# Patient Record
Sex: MALE | Race: WHITE | Employment: FULL TIME | URBAN - METROPOLITAN AREA
[De-identification: names, ages, dates, MRNs, and addresses within clinical notes are randomized per-mention and may not be internally consistent; named-entity substitution may affect disease eponyms.]

---

## 2021-01-04 ENCOUNTER — HOSPITAL ENCOUNTER (OUTPATIENT)
Dept: LAB | Age: 61
Discharge: HOME | End: 2021-01-04

## 2021-01-04 ENCOUNTER — TRANSCRIBE ORDER (OUTPATIENT)
Dept: REGISTRATION | Age: 61
End: 2021-01-04

## 2021-01-04 ENCOUNTER — HOSPITAL ENCOUNTER (OUTPATIENT)
Dept: NON INVASIVE DIAGNOSTICS | Age: 61
Discharge: HOME | End: 2021-01-04
Payer: COMMERCIAL

## 2021-01-04 DIAGNOSIS — M16.11 PRIMARY OSTEOARTHRITIS OF RIGHT HIP: ICD-10-CM

## 2021-01-04 DIAGNOSIS — Z01.812 BLOOD TESTS PRIOR TO TREATMENT OR PROCEDURE: ICD-10-CM

## 2021-01-04 DIAGNOSIS — M16.11 PRIMARY OSTEOARTHRITIS OF RIGHT HIP: Primary | ICD-10-CM

## 2021-01-04 DIAGNOSIS — Z01.812 BLOOD TESTS PRIOR TO TREATMENT OR PROCEDURE: Primary | ICD-10-CM

## 2021-01-04 LAB — XX-LABCORP SPECIMEN COL,LCBCF: NORMAL

## 2021-01-04 PROCEDURE — 93005 ELECTROCARDIOGRAM TRACING: CPT

## 2021-01-04 PROCEDURE — 99001 SPECIMEN HANDLING PT-LAB: CPT

## 2021-01-05 LAB
ATRIAL RATE: 56 BPM
CALCULATED P AXIS, ECG09: 68 DEGREES
CALCULATED R AXIS, ECG10: 78 DEGREES
CALCULATED T AXIS, ECG11: 72 DEGREES
DIAGNOSIS, 93000: NORMAL
P-R INTERVAL, ECG05: 182 MS
Q-T INTERVAL, ECG07: 438 MS
QRS DURATION, ECG06: 88 MS
QTC CALCULATION (BEZET), ECG08: 422 MS
VENTRICULAR RATE, ECG03: 56 BPM

## 2021-09-16 NOTE — NURSE NAVIGATOR
Greta Moreno watched the [employer]onna[employer] and received a preoperative education booklet in anticipation of joint replacement surgery.      Orthopaedic Nurse Navigator

## 2021-10-12 ENCOUNTER — TRANSCRIBE ORDER (OUTPATIENT)
Dept: REGISTRATION | Age: 61
End: 2021-10-12

## 2021-10-12 ENCOUNTER — HOSPITAL ENCOUNTER (OUTPATIENT)
Dept: PREADMISSION TESTING | Age: 61
Discharge: HOME | End: 2021-10-12
Payer: COMMERCIAL

## 2021-10-12 ENCOUNTER — HOSPITAL ENCOUNTER (OUTPATIENT)
Dept: LAB | Age: 61
Discharge: HOME | End: 2021-10-12

## 2021-10-12 DIAGNOSIS — M16.11 PRIMARY OSTEOARTHRITIS OF RIGHT HIP: ICD-10-CM

## 2021-10-12 DIAGNOSIS — M16.11 PRIMARY OSTEOARTHRITIS OF RIGHT HIP: Primary | ICD-10-CM

## 2021-10-12 LAB
ATRIAL RATE: 52 BPM
CALCULATED P AXIS, ECG09: 49 DEGREES
CALCULATED R AXIS, ECG10: 77 DEGREES
CALCULATED T AXIS, ECG11: 55 DEGREES
DIAGNOSIS, 93000: NORMAL
P-R INTERVAL, ECG05: 170 MS
Q-T INTERVAL, ECG07: 414 MS
QRS DURATION, ECG06: 82 MS
QTC CALCULATION (BEZET), ECG08: 385 MS
SENTARA SPECIMEN COL,SENBCF: NORMAL
VENTRICULAR RATE, ECG03: 52 BPM

## 2021-10-12 PROCEDURE — 93005 ELECTROCARDIOGRAM TRACING: CPT

## 2021-10-12 PROCEDURE — 99001 SPECIMEN HANDLING PT-LAB: CPT

## 2021-10-22 ENCOUNTER — HOSPITAL ENCOUNTER (OUTPATIENT)
Dept: PREADMISSION TESTING | Age: 61
Discharge: HOME | End: 2021-10-22

## 2021-10-22 VITALS — WEIGHT: 175 LBS | BODY MASS INDEX: 25.92 KG/M2 | HEIGHT: 69 IN

## 2021-10-22 RX ORDER — ATORVASTATIN CALCIUM 80 MG/1
80 TABLET, FILM COATED ORAL
COMMUNITY

## 2021-10-22 RX ORDER — GLUCOSAMINE SULFATE 1500 MG
1000 POWDER IN PACKET (EA) ORAL DAILY
COMMUNITY

## 2021-10-22 RX ORDER — CEFAZOLIN SODIUM/WATER 2 G/20 ML
2 SYRINGE (ML) INTRAVENOUS ONCE
Status: CANCELLED | OUTPATIENT
Start: 2021-10-22 | End: 2021-10-22

## 2021-10-22 RX ORDER — LANOLIN ALCOHOL/MO/W.PET/CERES
1000 CREAM (GRAM) TOPICAL DAILY
COMMUNITY

## 2021-10-22 RX ORDER — DIPHENHYDRAMINE HCL 25 MG
25 CAPSULE ORAL
COMMUNITY

## 2021-10-22 RX ORDER — SODIUM CHLORIDE, SODIUM LACTATE, POTASSIUM CHLORIDE, CALCIUM CHLORIDE 600; 310; 30; 20 MG/100ML; MG/100ML; MG/100ML; MG/100ML
125 INJECTION, SOLUTION INTRAVENOUS CONTINUOUS
Status: CANCELLED | OUTPATIENT
Start: 2021-10-22

## 2021-10-22 RX ORDER — CELECOXIB 200 MG/1
200 CAPSULE ORAL 2 TIMES DAILY
COMMUNITY
End: 2021-10-28

## 2021-10-22 NOTE — PERIOP NOTES
Patient educated on NPO, CHG, and current COVID 19 protocols. Patient states he is fully vaccinated. Patient states he will bring vaccination record to [facility] on 10/25/2021 between [id number] when COVID screened with self quarantine from that date till [address]. Medications reviewed. Patient educated on current visitation policies. Patient advised to leave valuables at home or with a loved one. All questions answered by RN. Preoperative Nutrition Screen (WALESKA)   Patient's Age: 59 y.o. Patient's BMI: Estimated body mass index is 25.84 kg/m² as calculated from the following:    Height as of this encounter: 5' 9\" (1.753 m). Weight as of this encounter: 79.4 kg (175 lb). 1. Does the patient have a documented serum albumin less than 3.0 within the last 90 days? No = 0          2. Is patient's BMI less than 18.5 (or less than 20 if age over 72)? No = 0        3. Has the patient had an unplanned weight loss of 10% of body weight or more in the last 6 months? No = 0   4. Has the patient been eating less than 50% of their normal diet in the preceding week?  No = 0   WALESKA Score (number of Yes responses), 0-4 0       Electronically signed by Saadia Florez on 10/22/21 at 11:32 AM

## 2021-10-25 ENCOUNTER — HOSPITAL ENCOUNTER (OUTPATIENT)
Dept: PREADMISSION TESTING | Age: 61
Discharge: HOME | End: 2021-10-25
Payer: COMMERCIAL

## 2021-10-25 PROCEDURE — U0003 INFECTIOUS AGENT DETECTION BY NUCLEIC ACID (DNA OR RNA); SEVERE ACUTE RESPIRATORY SYNDROME CORONAVIRUS 2 (SARS-COV-2) (CORONAVIRUS DISEASE [COVID-19]), AMPLIFIED PROBE TECHNIQUE, MAKING USE OF HIGH THROUGHPUT TECHNOLOGIES AS DESCRIBED BY CMS-2020-01-R: HCPCS

## 2021-10-27 ENCOUNTER — ANESTHESIA EVENT (OUTPATIENT)
Dept: SURGERY | Age: 61
End: 2021-10-27
Payer: COMMERCIAL

## 2021-10-27 ENCOUNTER — HOME HEALTH ADMISSION (OUTPATIENT)
Dept: HOME HEALTH SERVICES | Facility: HOME HEALTH | Age: 61
End: 2021-10-27
Payer: COMMERCIAL

## 2021-10-27 PROBLEM — M16.11 OSTEOARTHRITIS OF RIGHT HIP: Chronic | Status: ACTIVE | Noted: 2021-10-27

## 2021-10-27 LAB — SARS-COV-2, NAA: NOT DETECTED

## 2021-10-27 RX ORDER — ACETAMINOPHEN 500 MG
1000 TABLET ORAL
Status: CANCELLED | OUTPATIENT
Start: 2021-10-27 | End: 2021-10-27

## 2021-10-27 NOTE — H&P
[address] [facility] [address]  History and Physical Exam    Patient: Leah Nichols MRN: [id number]  SSN: [id number]    YOB: 1960  Age: 59 y.o. Sex: male      Subjective:      Chief Complaint: right hip pain    History of Present Illness:  Patient complains of right hip pain and difficulty ambulating, which has progressed over the past several months. X-rays showed osteoarthritis of the joint. The patient's pain has persisted and progressed despite conservative treatments and therapies. The patient has been previously treated with medications and/or injections. The patient has at this time opted for surgical intervention. Past Medical History:   Diagnosis Date    Arthritis     [facility]     factor VII    Osteoarthritis of right hip 10/27/2021     Past Surgical History:   Procedure Laterality Date    HX ORTHOPAEDIC Left 2010    hip replacement     Social History     Occupational History    Not on file   Tobacco Use    Smoking status: Never Smoker    Smokeless tobacco: Never Used   Vaping Use    Vaping Use: Never used   Substance and Sexual Activity    Alcohol use: Yes     Alcohol/week: 15.0 standard drinks     Types: 15 Glasses of wine per week    Drug use: Never    Sexual activity: Not on file     Prior to Admission medications    Medication Sig Start Date End Date Taking? Authorizing Provider   celecoxib (CeleBREX) 200 mg capsule Take 200 mg by mouth two (2) times a day. Provider, Historical   atorvastatin (LIPITOR) 80 mg tablet Take 80 mg by mouth nightly. Provider, Historical   diphenhydrAMINE (BenadryL) 25 mg capsule Take 25 mg by mouth nightly. Provider, Historical   cyanocobalamin (Vitamin B-12) 1,000 mcg tablet Take 1,000 mcg by mouth daily. Provider, Historical   cholecalciferol (Vitamin D3) 25 mcg (1,000 unit) cap Take 1,000 Units by mouth daily.     Provider, Historical       Allergies: No Known Allergies     Review of Systems:  Pertinent items are noted in the History of Present Illness. Objective:       Physical Exam:  HEENT: Normocephalic, atraumatic  Lungs:  Clear to auscultation  Heart:   Regular rate and rhythm  Abdomen: Soft  Extremities:  Pain with range of motion of the right hip. Passive flexion  degrees,                       passive internal rotation 0-10 degrees, with pain throughout ROM,                        passive external rotation 10-20 degrees with pain at the arc of motion. Antalgic gait noted. Assessment:      Arthritis of the right hip. Plan:       Proceed with scheduled RIGHT TOTAL HIP ARTHROPLASTY. The various methods of treatment have been discussed with the patient and family. After consideration of risks, benefits, and other options for treatment, the patient has consented to surgical interventions. Questions were answered and preoperative teaching was done by Dr Dereje Quintero.      Signed By: PALOMO Wood     October 27, 2021

## 2021-10-28 ENCOUNTER — APPOINTMENT (OUTPATIENT)
Dept: GENERAL RADIOLOGY | Age: 61
End: 2021-10-28
Attending: PHYSICIAN ASSISTANT
Payer: COMMERCIAL

## 2021-10-28 ENCOUNTER — ANESTHESIA (OUTPATIENT)
Dept: SURGERY | Age: 61
End: 2021-10-28
Payer: COMMERCIAL

## 2021-10-28 ENCOUNTER — APPOINTMENT (OUTPATIENT)
Dept: GENERAL RADIOLOGY | Age: 61
End: 2021-10-28
Attending: ORTHOPAEDIC SURGERY
Payer: COMMERCIAL

## 2021-10-28 ENCOUNTER — HOSPITAL ENCOUNTER (OUTPATIENT)
Age: 61
Discharge: HOME HEALTH | End: 2021-10-28
Attending: ORTHOPAEDIC SURGERY | Admitting: ORTHOPAEDIC SURGERY
Payer: COMMERCIAL

## 2021-10-28 VITALS
OXYGEN SATURATION: 97 % | BODY MASS INDEX: 26.17 KG/M2 | RESPIRATION RATE: 16 BRPM | WEIGHT: 176.7 LBS | HEIGHT: 69 IN | DIASTOLIC BLOOD PRESSURE: 73 MMHG | SYSTOLIC BLOOD PRESSURE: 117 MMHG | HEART RATE: 57 BPM | TEMPERATURE: 97.7 F

## 2021-10-28 DIAGNOSIS — M16.11 PRIMARY OSTEOARTHRITIS OF RIGHT HIP: Primary | ICD-10-CM

## 2021-10-28 LAB
ABO + RH BLD: NORMAL
BLOOD GROUP ANTIBODIES SERPL: NORMAL
SPECIMEN EXP DATE BLD: NORMAL

## 2021-10-28 PROCEDURE — 74011250636 HC RX REV CODE- 250/636: Performed by: ORTHOPAEDIC SURGERY

## 2021-10-28 PROCEDURE — 74011250636 HC RX REV CODE- 250/636: Performed by: ANESTHESIOLOGY

## 2021-10-28 PROCEDURE — 76010000153 HC OR TIME 1.5 TO 2 HR: Performed by: ORTHOPAEDIC SURGERY

## 2021-10-28 PROCEDURE — 86901 BLOOD TYPING SEROLOGIC RH(D): CPT

## 2021-10-28 PROCEDURE — 76060000034 HC ANESTHESIA 1.5 TO 2 HR: Performed by: ORTHOPAEDIC SURGERY

## 2021-10-28 PROCEDURE — 77030027138 HC INCENT SPIROMETER -A: Performed by: ORTHOPAEDIC SURGERY

## 2021-10-28 PROCEDURE — 77030011264 HC ELECTRD BLD EXT COVD -A: Performed by: ORTHOPAEDIC SURGERY

## 2021-10-28 PROCEDURE — 77030003666 HC NDL SPINAL BD -A: Performed by: ORTHOPAEDIC SURGERY

## 2021-10-28 PROCEDURE — 97165 OT EVAL LOW COMPLEX 30 MIN: CPT

## 2021-10-28 PROCEDURE — C1776 JOINT DEVICE (IMPLANTABLE): HCPCS | Performed by: ORTHOPAEDIC SURGERY

## 2021-10-28 PROCEDURE — 77010033678 HC OXYGEN DAILY

## 2021-10-28 PROCEDURE — 97161 PT EVAL LOW COMPLEX 20 MIN: CPT

## 2021-10-28 PROCEDURE — 77030031139 HC SUT VCRL2 J&J -A: Performed by: ORTHOPAEDIC SURGERY

## 2021-10-28 PROCEDURE — 74011250636 HC RX REV CODE- 250/636: Performed by: NURSE ANESTHETIST, CERTIFIED REGISTERED

## 2021-10-28 PROCEDURE — 77030012893: Performed by: ORTHOPAEDIC SURGERY

## 2021-10-28 PROCEDURE — 77030020782 HC GWN BAIR PAWS FLX 3M -B: Performed by: ORTHOPAEDIC SURGERY

## 2021-10-28 PROCEDURE — 74011250637 HC RX REV CODE- 250/637: Performed by: PHYSICIAN ASSISTANT

## 2021-10-28 PROCEDURE — 77030037713 HC CLOSR DEV INCIS ZIP STRY -B: Performed by: ORTHOPAEDIC SURGERY

## 2021-10-28 PROCEDURE — 74011000250 HC RX REV CODE- 250: Performed by: ORTHOPAEDIC SURGERY

## 2021-10-28 PROCEDURE — 76210000006 HC OR PH I REC 0.5 TO 1 HR: Performed by: ORTHOPAEDIC SURGERY

## 2021-10-28 PROCEDURE — 97535 SELF CARE MNGMENT TRAINING: CPT

## 2021-10-28 PROCEDURE — 2709999900 HC NON-CHARGEABLE SUPPLY: Performed by: ORTHOPAEDIC SURGERY

## 2021-10-28 PROCEDURE — 74011000250 HC RX REV CODE- 250: Performed by: NURSE ANESTHETIST, CERTIFIED REGISTERED

## 2021-10-28 PROCEDURE — 73501 X-RAY EXAM HIP UNI 1 VIEW: CPT

## 2021-10-28 PROCEDURE — 74011250637 HC RX REV CODE- 250/637: Performed by: ORTHOPAEDIC SURGERY

## 2021-10-28 PROCEDURE — 77030013708 HC HNDPC SUC IRR PULS STRY –B: Performed by: ORTHOPAEDIC SURGERY

## 2021-10-28 PROCEDURE — 77030038010: Performed by: ORTHOPAEDIC SURGERY

## 2021-10-28 PROCEDURE — 77030040361 HC SLV COMPR DVT MDII -B: Performed by: ORTHOPAEDIC SURGERY

## 2021-10-28 PROCEDURE — 77030041461 HC RETRCTR GRIPPR KUSA -C: Performed by: ORTHOPAEDIC SURGERY

## 2021-10-28 PROCEDURE — 77030041075 HC DRSG AG OPTIFRM MDII -B: Performed by: ORTHOPAEDIC SURGERY

## 2021-10-28 PROCEDURE — 76210000023 HC REC RM PH II 2 TO 2.5 HR

## 2021-10-28 PROCEDURE — 74011250636 HC RX REV CODE- 250/636: Performed by: PHYSICIAN ASSISTANT

## 2021-10-28 PROCEDURE — 97116 GAIT TRAINING THERAPY: CPT

## 2021-10-28 PROCEDURE — 36415 COLL VENOUS BLD VENIPUNCTURE: CPT

## 2021-10-28 DEVICE — IMPLANTABLE DEVICE
Type: IMPLANTABLE DEVICE | Site: HIP | Status: FUNCTIONAL
Brand: SIGNATURE FEMORAL HEAD

## 2021-10-28 DEVICE — IMPLANTABLE DEVICE
Type: IMPLANTABLE DEVICE | Site: HIP | Status: FUNCTIONAL
Brand: LOGICAL

## 2021-10-28 DEVICE — IMPLANTABLE DEVICE
Type: IMPLANTABLE DEVICE | Site: HIP | Status: FUNCTIONAL
Brand: SPARTAN HIP STEM

## 2021-10-28 DEVICE — IMPLANTABLE DEVICE
Type: IMPLANTABLE DEVICE | Site: HIP | Status: FUNCTIONAL
Brand: LOGICAL G-SERIES

## 2021-10-28 RX ORDER — PANTOPRAZOLE SODIUM 40 MG/1
40 TABLET, DELAYED RELEASE ORAL DAILY
Status: DISCONTINUED | OUTPATIENT
Start: 2021-10-28 | End: 2021-10-28 | Stop reason: HOSPADM

## 2021-10-28 RX ORDER — SODIUM CHLORIDE, SODIUM LACTATE, POTASSIUM CHLORIDE, CALCIUM CHLORIDE 600; 310; 30; 20 MG/100ML; MG/100ML; MG/100ML; MG/100ML
125 INJECTION, SOLUTION INTRAVENOUS CONTINUOUS
Status: DISCONTINUED | OUTPATIENT
Start: 2021-10-28 | End: 2021-10-28 | Stop reason: HOSPADM

## 2021-10-28 RX ORDER — OXYCODONE HYDROCHLORIDE 5 MG/1
5 TABLET ORAL
Status: DISCONTINUED | OUTPATIENT
Start: 2021-10-28 | End: 2021-10-28 | Stop reason: HOSPADM

## 2021-10-28 RX ORDER — SODIUM CHLORIDE 9 MG/ML
125 INJECTION, SOLUTION INTRAVENOUS CONTINUOUS
Status: DISCONTINUED | OUTPATIENT
Start: 2021-10-28 | End: 2021-10-28 | Stop reason: HOSPADM

## 2021-10-28 RX ORDER — HYDROMORPHONE HYDROCHLORIDE 1 MG/ML
0.2 INJECTION, SOLUTION INTRAMUSCULAR; INTRAVENOUS; SUBCUTANEOUS
Status: DISCONTINUED | OUTPATIENT
Start: 2021-10-28 | End: 2021-10-28 | Stop reason: HOSPADM

## 2021-10-28 RX ORDER — DEXAMETHASONE SODIUM PHOSPHATE 4 MG/ML
8 INJECTION, SOLUTION INTRA-ARTICULAR; INTRALESIONAL; INTRAMUSCULAR; INTRAVENOUS; SOFT TISSUE ONCE
Status: COMPLETED | OUTPATIENT
Start: 2021-10-28 | End: 2021-10-28

## 2021-10-28 RX ORDER — DOCUSATE SODIUM 100 MG/1
100 CAPSULE, LIQUID FILLED ORAL DAILY
Status: DISCONTINUED | OUTPATIENT
Start: 2021-10-29 | End: 2021-10-28 | Stop reason: HOSPADM

## 2021-10-28 RX ORDER — SODIUM CHLORIDE 0.9 % (FLUSH) 0.9 %
5-40 SYRINGE (ML) INJECTION AS NEEDED
Status: DISCONTINUED | OUTPATIENT
Start: 2021-10-28 | End: 2021-10-28 | Stop reason: HOSPADM

## 2021-10-28 RX ORDER — PROPOFOL 10 MG/ML
INJECTION, EMULSION INTRAVENOUS AS NEEDED
Status: DISCONTINUED | OUTPATIENT
Start: 2021-10-28 | End: 2021-10-28 | Stop reason: HOSPADM

## 2021-10-28 RX ORDER — ASPIRIN 81 MG/1
81 TABLET ORAL 2 TIMES DAILY
Status: DISCONTINUED | OUTPATIENT
Start: 2021-10-28 | End: 2021-10-28 | Stop reason: HOSPADM

## 2021-10-28 RX ORDER — TRANEXAMIC ACID 10 MG/ML
1000 INJECTION, SOLUTION INTRAVENOUS SEE ADMIN INSTRUCTIONS
Status: DISCONTINUED | OUTPATIENT
Start: 2021-10-28 | End: 2021-10-28 | Stop reason: HOSPADM

## 2021-10-28 RX ORDER — OXYCODONE HYDROCHLORIDE 5 MG/1
5 TABLET ORAL AS NEEDED
Status: DISCONTINUED | OUTPATIENT
Start: 2021-10-28 | End: 2021-10-28 | Stop reason: HOSPADM

## 2021-10-28 RX ORDER — KETAMINE HYDROCHLORIDE 10 MG/ML
INJECTION, SOLUTION INTRAMUSCULAR; INTRAVENOUS AS NEEDED
Status: DISCONTINUED | OUTPATIENT
Start: 2021-10-28 | End: 2021-10-28 | Stop reason: HOSPADM

## 2021-10-28 RX ORDER — ACETAMINOPHEN 500 MG
1000 TABLET ORAL ONCE
Status: COMPLETED | OUTPATIENT
Start: 2021-10-28 | End: 2021-10-28

## 2021-10-28 RX ORDER — NALOXONE HYDROCHLORIDE 0.4 MG/ML
0.4 INJECTION, SOLUTION INTRAMUSCULAR; INTRAVENOUS; SUBCUTANEOUS AS NEEDED
Status: DISCONTINUED | OUTPATIENT
Start: 2021-10-28 | End: 2021-10-28 | Stop reason: HOSPADM

## 2021-10-28 RX ORDER — MIDAZOLAM HYDROCHLORIDE 1 MG/ML
INJECTION, SOLUTION INTRAMUSCULAR; INTRAVENOUS AS NEEDED
Status: DISCONTINUED | OUTPATIENT
Start: 2021-10-28 | End: 2021-10-28 | Stop reason: HOSPADM

## 2021-10-28 RX ORDER — EPHEDRINE SULFATE/0.9% NACL/PF 50 MG/5 ML
SYRINGE (ML) INTRAVENOUS AS NEEDED
Status: DISCONTINUED | OUTPATIENT
Start: 2021-10-28 | End: 2021-10-28 | Stop reason: HOSPADM

## 2021-10-28 RX ORDER — ACETAMINOPHEN 325 MG/1
650 TABLET ORAL EVERY 6 HOURS
Status: DISCONTINUED | OUTPATIENT
Start: 2021-10-28 | End: 2021-10-28 | Stop reason: HOSPADM

## 2021-10-28 RX ORDER — DIPHENHYDRAMINE HYDROCHLORIDE 50 MG/ML
12.5 INJECTION, SOLUTION INTRAMUSCULAR; INTRAVENOUS
Status: DISCONTINUED | OUTPATIENT
Start: 2021-10-28 | End: 2021-10-28 | Stop reason: HOSPADM

## 2021-10-28 RX ORDER — METOCLOPRAMIDE HYDROCHLORIDE 5 MG/ML
10 INJECTION INTRAMUSCULAR; INTRAVENOUS
Status: DISCONTINUED | OUTPATIENT
Start: 2021-10-28 | End: 2021-10-28 | Stop reason: HOSPADM

## 2021-10-28 RX ORDER — SODIUM CHLORIDE 9 MG/ML
300 INJECTION, SOLUTION INTRAVENOUS CONTINUOUS
Status: DISCONTINUED | OUTPATIENT
Start: 2021-10-28 | End: 2021-10-28 | Stop reason: HOSPADM

## 2021-10-28 RX ORDER — LABETALOL HCL 20 MG/4 ML
SYRINGE (ML) INTRAVENOUS AS NEEDED
Status: DISCONTINUED | OUTPATIENT
Start: 2021-10-28 | End: 2021-10-28 | Stop reason: HOSPADM

## 2021-10-28 RX ORDER — CEFADROXIL 500 MG/1
500 CAPSULE ORAL 2 TIMES DAILY
Qty: 10 CAPSULE | Refills: 0 | Status: SHIPPED | OUTPATIENT
Start: 2021-10-28 | End: 2021-11-02

## 2021-10-28 RX ORDER — ONDANSETRON 2 MG/ML
INJECTION INTRAMUSCULAR; INTRAVENOUS AS NEEDED
Status: DISCONTINUED | OUTPATIENT
Start: 2021-10-28 | End: 2021-10-28 | Stop reason: HOSPADM

## 2021-10-28 RX ORDER — TRANEXAMIC ACID 100 MG/ML
INJECTION, SOLUTION INTRAVENOUS AS NEEDED
Status: DISCONTINUED | OUTPATIENT
Start: 2021-10-28 | End: 2021-10-28 | Stop reason: HOSPADM

## 2021-10-28 RX ORDER — SODIUM CHLORIDE 0.9 % (FLUSH) 0.9 %
5-40 SYRINGE (ML) INJECTION EVERY 8 HOURS
Status: DISCONTINUED | OUTPATIENT
Start: 2021-10-28 | End: 2021-10-28 | Stop reason: HOSPADM

## 2021-10-28 RX ORDER — DEXAMETHASONE SODIUM PHOSPHATE 4 MG/ML
INJECTION, SOLUTION INTRA-ARTICULAR; INTRALESIONAL; INTRAMUSCULAR; INTRAVENOUS; SOFT TISSUE AS NEEDED
Status: DISCONTINUED | OUTPATIENT
Start: 2021-10-28 | End: 2021-10-28 | Stop reason: HOSPADM

## 2021-10-28 RX ORDER — KETOROLAC TROMETHAMINE 30 MG/ML
15 INJECTION, SOLUTION INTRAMUSCULAR; INTRAVENOUS EVERY 6 HOURS
Status: DISCONTINUED | OUTPATIENT
Start: 2021-10-28 | End: 2021-10-28 | Stop reason: HOSPADM

## 2021-10-28 RX ORDER — TRANEXAMIC ACID 10 MG/ML
1000 INJECTION, SOLUTION INTRAVENOUS SEE ADMIN INSTRUCTIONS
Status: DISCONTINUED | OUTPATIENT
Start: 2021-10-28 | End: 2021-10-28

## 2021-10-28 RX ORDER — GLYCOPYRROLATE 0.2 MG/ML
INJECTION INTRAMUSCULAR; INTRAVENOUS AS NEEDED
Status: DISCONTINUED | OUTPATIENT
Start: 2021-10-28 | End: 2021-10-28 | Stop reason: HOSPADM

## 2021-10-28 RX ORDER — ZOLPIDEM TARTRATE 5 MG/1
5-10 TABLET ORAL
Status: DISCONTINUED | OUTPATIENT
Start: 2021-10-28 | End: 2021-10-28 | Stop reason: HOSPADM

## 2021-10-28 RX ORDER — LIDOCAINE HYDROCHLORIDE 20 MG/ML
INJECTION, SOLUTION EPIDURAL; INFILTRATION; INTRACAUDAL; PERINEURAL AS NEEDED
Status: DISCONTINUED | OUTPATIENT
Start: 2021-10-28 | End: 2021-10-28 | Stop reason: HOSPADM

## 2021-10-28 RX ORDER — CEFAZOLIN SODIUM/WATER 2 G/20 ML
2 SYRINGE (ML) INTRAVENOUS ONCE
Status: COMPLETED | OUTPATIENT
Start: 2021-10-28 | End: 2021-10-28

## 2021-10-28 RX ORDER — OXYCODONE HYDROCHLORIDE 5 MG/1
10 TABLET ORAL
Status: DISCONTINUED | OUTPATIENT
Start: 2021-10-28 | End: 2021-10-28 | Stop reason: HOSPADM

## 2021-10-28 RX ORDER — LANOLIN ALCOHOL/MO/W.PET/CERES
1 CREAM (GRAM) TOPICAL 3 TIMES DAILY
Status: DISCONTINUED | OUTPATIENT
Start: 2021-10-28 | End: 2021-10-28 | Stop reason: HOSPADM

## 2021-10-28 RX ORDER — FENTANYL CITRATE 50 UG/ML
INJECTION, SOLUTION INTRAMUSCULAR; INTRAVENOUS AS NEEDED
Status: DISCONTINUED | OUTPATIENT
Start: 2021-10-28 | End: 2021-10-28 | Stop reason: HOSPADM

## 2021-10-28 RX ORDER — OXYCODONE HYDROCHLORIDE 5 MG/1
5 TABLET ORAL
Qty: 42 TABLET | Refills: 0 | Status: SHIPPED | OUTPATIENT
Start: 2021-10-28 | End: 2021-11-04

## 2021-10-28 RX ORDER — HYDROMORPHONE HYDROCHLORIDE 2 MG/ML
INJECTION, SOLUTION INTRAMUSCULAR; INTRAVENOUS; SUBCUTANEOUS AS NEEDED
Status: DISCONTINUED | OUTPATIENT
Start: 2021-10-28 | End: 2021-10-28 | Stop reason: HOSPADM

## 2021-10-28 RX ORDER — ONDANSETRON 2 MG/ML
4 INJECTION INTRAMUSCULAR; INTRAVENOUS
Status: DISCONTINUED | OUTPATIENT
Start: 2021-10-28 | End: 2021-10-28 | Stop reason: HOSPADM

## 2021-10-28 RX ORDER — CEFAZOLIN SODIUM/WATER 2 G/20 ML
2 SYRINGE (ML) INTRAVENOUS EVERY 8 HOURS
Status: DISCONTINUED | OUTPATIENT
Start: 2021-10-28 | End: 2021-10-28 | Stop reason: HOSPADM

## 2021-10-28 RX ADMIN — PROPOFOL 200 MG: 10 INJECTION, EMULSION INTRAVENOUS at 10:30

## 2021-10-28 RX ADMIN — Medication 10 MG: at 10:46

## 2021-10-28 RX ADMIN — HYDROMORPHONE HYDROCHLORIDE 0.5 MG: 2 INJECTION, SOLUTION INTRAMUSCULAR; INTRAVENOUS; SUBCUTANEOUS at 10:26

## 2021-10-28 RX ADMIN — MIDAZOLAM 2 MG: 1 INJECTION INTRAMUSCULAR; INTRAVENOUS at 10:24

## 2021-10-28 RX ADMIN — PANTOPRAZOLE SODIUM 40 MG: 40 TABLET, DELAYED RELEASE ORAL at 08:05

## 2021-10-28 RX ADMIN — LABETALOL 20 MG/4 ML (5 MG/ML) INTRAVENOUS SYRINGE 5 MG: at 11:16

## 2021-10-28 RX ADMIN — HYDROMORPHONE HYDROCHLORIDE 0.2 MG: 1 INJECTION, SOLUTION INTRAMUSCULAR; INTRAVENOUS; SUBCUTANEOUS at 12:16

## 2021-10-28 RX ADMIN — KETAMINE HYDROCHLORIDE 20 MG: 10 INJECTION, SOLUTION INTRAMUSCULAR; INTRAVENOUS at 10:55

## 2021-10-28 RX ADMIN — FENTANYL CITRATE 50 MCG: 50 INJECTION, SOLUTION INTRAMUSCULAR; INTRAVENOUS at 11:02

## 2021-10-28 RX ADMIN — HYDROMORPHONE HYDROCHLORIDE 0.2 MG: 1 INJECTION, SOLUTION INTRAMUSCULAR; INTRAVENOUS; SUBCUTANEOUS at 12:11

## 2021-10-28 RX ADMIN — SODIUM CHLORIDE, SODIUM LACTATE, POTASSIUM CHLORIDE, AND CALCIUM CHLORIDE 1000 ML: 600; 310; 30; 20 INJECTION, SOLUTION INTRAVENOUS at 08:00

## 2021-10-28 RX ADMIN — HYDROMORPHONE HYDROCHLORIDE 0.5 MG: 2 INJECTION, SOLUTION INTRAMUSCULAR; INTRAVENOUS; SUBCUTANEOUS at 10:57

## 2021-10-28 RX ADMIN — Medication 5 MG: at 10:49

## 2021-10-28 RX ADMIN — DEXAMETHASONE SODIUM PHOSPHATE 8 MG: 4 INJECTION, SOLUTION INTRAMUSCULAR; INTRAVENOUS at 08:04

## 2021-10-28 RX ADMIN — SODIUM CHLORIDE, SODIUM LACTATE, POTASSIUM CHLORIDE, AND CALCIUM CHLORIDE 125 ML/HR: 600; 310; 30; 20 INJECTION, SOLUTION INTRAVENOUS at 08:00

## 2021-10-28 RX ADMIN — DEXAMETHASONE SODIUM PHOSPHATE 4 MG: 4 INJECTION, SOLUTION INTRAMUSCULAR; INTRAVENOUS at 10:49

## 2021-10-28 RX ADMIN — FENTANYL CITRATE 50 MCG: 50 INJECTION, SOLUTION INTRAMUSCULAR; INTRAVENOUS at 11:09

## 2021-10-28 RX ADMIN — KETAMINE HYDROCHLORIDE 20 MG: 10 INJECTION, SOLUTION INTRAMUSCULAR; INTRAVENOUS at 10:41

## 2021-10-28 RX ADMIN — KETAMINE HYDROCHLORIDE 10 MG: 10 INJECTION, SOLUTION INTRAMUSCULAR; INTRAVENOUS at 11:14

## 2021-10-28 RX ADMIN — ONDANSETRON HYDROCHLORIDE 4 MG: 2 INJECTION INTRAMUSCULAR; INTRAVENOUS at 11:35

## 2021-10-28 RX ADMIN — CEFAZOLIN 2 G: 1 INJECTION, POWDER, FOR SOLUTION INTRAVENOUS at 10:26

## 2021-10-28 RX ADMIN — GLYCOPYRROLATE 0.2 MG: 0.2 INJECTION INTRAMUSCULAR; INTRAVENOUS at 10:45

## 2021-10-28 RX ADMIN — ACETAMINOPHEN 1000 MG: 500 TABLET ORAL at 08:05

## 2021-10-28 RX ADMIN — LIDOCAINE HYDROCHLORIDE 100 MG: 20 INJECTION, SOLUTION INTRAVENOUS at 10:28

## 2021-10-28 NOTE — PROGRESS NOTES
Problem: Mobility Impaired (Adult and Pediatric)  Goal: *Acute Goals and Plan of Care (Insert Text)  Description: PT goals to be met in 1 day:  Pt will be able to perform supine<>sit SBA for transfers at home. Pt will be able to perform sit<>stand SBA for increased ability to transfer at home safely. Pt will be able to participate in gt training >250' w/ RW, WBAT, GB and CGA/SBA for improved ability in home upon d/c. Pt will be able to perform stair training step to pattern, B/U rail and CGA to obtain safe entry into home upon d/c. Pt will be educated regarding HEP per MD protocol for optimal AROM/strength outcomes. Note: [x]  Patient has met MD mobilization critieria for d/c home   [x]  Recommend HH with 24 hour adult care   []  Benefit from additional acute PT session to address:      PHYSICAL THERAPY EVALUATION    Patient: Aman Dillon (85 y.o. male)  Date: 10/28/2021  Primary Diagnosis: Osteoarthritis of right hip [M16.11]  Procedure(s) (LRB):  RIGHT TOTAL HIP REPLACEMENT ANTERIOR APPROACH WITH C-ARM (Right) Day of Surgery   Precautions:   Fall, WBAT    PLOF: Independent     ASSESSMENT :  Based on the objective data described below, the patient presents with decreased mobility in regards to bed mobility, transfers, gt quality and tolerance, balance, stair negotiation and safety due to R FLACA surgery/hospitalization. Decreased AROM of R hip, dec strength of R hip, pain in R hip, dec sensation of R hip also impacting pt functional mobility. Pt rating pain on numerical pain scale pre/post and during session 4/10. Pt and wife ed regarding mobility safety, WB, HEP, ice application/use, elevation, environmental safety and home safe techniques. Pt sitting in recliner upon arrival.  Pt able to perform sit<>stand w/ CGA/SBA. Safety vc required throughout session to reinforce safety. Pt able to participate in gt training using RW, GB, WBAT and CGA w/ antalgic gt pattern.   Pt was able to participate in stair training using step to pattern, B rails and CGA. Answered questions by pt and wife in regards to PT and mobility. Pt left sitting in recliner w/ all needs within reach and ice pack to L hip. Nurse Donna aware of session and outcomes. Recommend HHPT with responsible adult care at least 24 hours upon hospital d/c. Patient will benefit from skilled intervention to address the above impairments. Patient's rehabilitation potential is considered to be Good  Factors which may influence rehabilitation potential include:   []         None noted  []         Mental ability/status  []         Medical condition  []         Home/family situation and support systems  []         Safety awareness  [x]         Pain tolerance/management  []         Other:      PLAN :  Recommendations and Planned Interventions:   [x]           Bed Mobility Training             []    Neuromuscular Re-Education  [x]           Transfer Training                   []    Orthotic/Prosthetic Training  [x]           Gait Training                          [x]    Modalities  [x]           Therapeutic Exercises           [x]    Edema Management/Control  [x]           Therapeutic Activities            [x]    Family Training/Education  [x]           Patient Education  []           Other (comment):    Frequency/Duration: Patient will be followed by physical therapy 1-2 times per day/4-7 days per week to address goals. Discharge Recommendations: Home Health  Further Equipment Recommendations for Discharge: N/A     SUBJECTIVE:   Patient stated I am just a little foggy.     OBJECTIVE DATA SUMMARY:     Past Medical History:   Diagnosis Date    Arthritis     Co[facility] ([facility]     factor V defiency von Leiden    Osteoarthritis of right hip 10/27/2021     Past Surgical History:   Procedure Laterality Date    HX ORTHOPAEDIC Left 2010    hip replacement     Barriers to Learning/Limitations: yes;  anesthesia  Compensate with: Visual Cues, Verbal Cues, and Tactile Cues  Home Situation:  Home Situation  Home Environment: Private residence  # Steps to Enter: 8  Rails to Enter: No  One/Two Story Residence: Two story  # of Interior Steps: 21  Interior Rails: Left  Lift Chair Available: No  Living Alone: No  Support Systems: Spouse/Significant Other  Patient Expects to be Discharged toVF Cor[de-identified]ration  Current DME Used/Available at Home: Brace/Splint, Walker, rolling, Cane, straight, Crutches  Tub or Shower Type: Tub/Shower combination  Critical Behavior:  Neurologic State: Alert  Orientation Level: Oriented to person;Oriented to place;Oriented to situation  Cognition: Follows commands  Safety/Judgement: Awareness of environment  Psychosocial  Patient Behaviors: Calm; Cooperative  Family  Behaviors: Calm;Supportive  Skin Condition/Temp: Dry;Warm  Family  Behaviors: Calm;Supportive  Skin Integrity: Incision (comment) (R hip)  Skin Integumentary  Skin Color: Appropriate for ethnicity  Skin Condition/Temp: Dry;Warm  Skin Integrity: Incision (comment) (R hip)  Strength:    Strength: Generally decreased, functional  Tone & Sensation:   Tone: Normal  Sensation: Impaired (R hip)  Range Of Motion:  AROM: Generally decreased, functional  Functional Mobility:  Bed Mobility:  Scooting: Stand-by assistance  Transfers:  Sit to Stand: Stand-by assistance (vc)  Stand to Sit: Stand-by assistance (vc)  Balance:   Sitting: Intact  Standing: Intact; With support  Ambulation/Gait Training:  Distance (ft): 250 Feet (ft)  Assistive Device: Walker, rolling;Gait belt  Ambulation - Level of Assistance: Contact guard assistance (vc)  Gait Abnormalities: Antalgic;Decreased step clearance; Step to gait  Right Side Weight Bearing: As tolerated  Base of Support: Shift to left  Stance: Right decreased  Speed/Moira: Slow  Step Length: Left shortened;Right shortened  Swing Pattern: Left asymmetrical;Right asymmetrical  Interventions: Safety awareness training; Tactile cues; Verbal cues;Visual/Demos  Stairs:  Number of Stairs Trained: 10  Stairs - Level of Assistance: Contact guard assistance (vc)  Rail Use: Both     Therapeutic Exercises:   Encouraged HEP  Pain:  Pain level pre-treatment: 4/10   Pain level post-treatment: 4/10   Pain Intervention(s) : Medication (see MAR); Rest, Ice, Repositioning  Response to intervention: Nurse notified, See doc flow    Activity Tolerance:   Fair   Please refer to the flowsheet for vital signs taken during this treatment. After treatment:   [x]         Patient left in no apparent distress sitting up in chair  []         Patient left in no apparent distress in bed  [x]         Call bell left within reach  [x]         Nursing notified  []         Caregiver present  []         Bed alarm activated  []         SCDs applied    COMMUNICATION/EDUCATION:   [x]         Role of Physical Therapy in the acute care setting. [x]         Fall prevention education was provided and the patient/caregiver indicated understanding. [x]         Patient/family have participated as able in goal setting and plan of care. [x]         Patient/family agree to work toward stated goals and plan of care. []         Patient understands intent and goals of therapy, but is neutral about his/her participation. []         Patient is unable to participate in goal setting/plan of care: ongoing with therapy staff.  []         Other:     Thank you for this referral.  Nelia Mcdonald, PT   Time Calculation: 27 mins      Eval Complexity: History: HIGH Complexity :3+ comorbidities / personal factors will impact the outcome/ POC Exam:MEDIUM Complexity : 3 Standardized tests and measures addressing body structure, function, activity limitation and / or participation in recreation  Presentation: LOW Complexity : Stable, uncomplicated  Clinical Decision Making:Low Complexity    Overall Complexity:LOW

## 2021-10-28 NOTE — PERIOP NOTES
Reviewed PTA medication list with patient/caregiver and patient/caregiver denies any additional medications. Patient admits to having a responsible adult care for them at home for at least 24 hours after surgery. Patient encouraged to use gown warming system and informed that using said warming gown to regulate body temperature prior to a procedure has been shown to help reduce the risks of blood clots and infection. Patient's pharmacy of choice verified and documented in PTA medication section. Dual skin assessment & fall risk band verification completed with [address] [place].

## 2021-10-28 NOTE — PROGRESS NOTES
65  Pt ambulated from stretcher to recliner without complication     [id number]  Pt has passed PT at this time. Still needs to void post op     56  Pt has voided post op. Pt ready for d/c    Dual AVS reviewed with YURIY Saeed rn. All medications reviewed individually with patient. Opportunities for questions and concerns provided. Patient to be discharged via (mode of transport ie. Car, ambulance or air transport) car. Patient's arm band appropriately discarded.     IV removed

## 2021-10-28 NOTE — ROUTINE PROCESS
TRANSFER - IN REPORT:    Verbal report received from ELVIN Smith rn(name) on [employer]  being received from [employer]) for routine post - op      Report consisted of patients Situation, Background, Assessment and   Recommendations(SBAR). Information from the following report(s) SBAR, Kardex, [facility] and Recent Results was reviewed with the receiving nurse. Opportunity for questions and clarification was provided. Assessment completed upon patients arrival to unit and care assumed.

## 2021-10-28 NOTE — DISCHARGE INSTRUCTIONS
[facility]   Patient Discharge Instructions    Lizeth Holliday / [id number] : 1960    Admitted 10/28/2021 Discharged: 10/28/2021     IF YOU HAVE ANY PROBLEMS ONCE YOU ARE AT HOME CALL THE FOLLOWING NUMBERS:   Main office number: [phone]    Your follow up appointment to see either Dr. Patsy Garrison PA-C, or [facility] as scheduled in 2 weeks. If you are unsure of your appointment date call the office at [phone]. Medication Instructions     · Resume your home medictions as directed, you may have directed not to resume supplements until after your follow up. · A prescription for pain medication has been given   · It is important that you take the medication exactly as they are prescribed. · Keep your medication in the bottles provided by the pharmacist and keep a list of the medication names, dosages, and times to be taken in your wallet. · Do not take other medications without consulting your doctor. What to do at [address] [place] [address] Ave your prehospital diet. If you have excessive nausea or vomitting call your doctor's office. Be sure to maintain adequate fluid intake. Some pain medications may cause constipation. Remember to drink fluids, stay as active as possible, and eat plenty of fiber-rich foods. Begin In-Home Physical Therapy; 3 times a week to work on gait training, range of motion, strengthening, and weight bearing exercises as tolerable. Continue to use your walker or cane when walking. May progress from the walker to a cane to complete total bearing as tolerable. Patient may shower. Wrap incision with plastic wrap/covering to prevent incision from getting wet. Avoid complete immersion. YOUR DRESSING SHOULD BE CHANGED BY YOUR HOME HEALTH NURSE 5-7 AFTER SURGERY ACCORDING TO THE DATE WRITTEN ON YOUR DRESSING.           When to Call    - Call if you have a temperature greater then 101  - Unable to keep food down  - Are unable to bear any wieght   - Need a pain medication refill     Information obtained by :  I understand that if any problems occur once I am at home I am to contact my physician. I understand and acknowledge receipt of the instructions indicated above.                                                                                                                                            Physician's or R.N.'s Signature                                                                  Date/Time                                                                                                                                              Patient or Representative Signature                                                          Date/Time

## 2021-10-28 NOTE — PERIOP NOTES
[facility] made aware that SBAR is ready for review. Patient assigned room #230.  Donna will be the nurse

## 2021-10-28 NOTE — PROGRESS NOTES
9053  Same Day Discharge     - Patient arrives to unit at this time. Dual skin assessment completed with YURIY Saeed rn, no abnormalities noted other than surgical incision to right hip.  mepilex silver dressing CDI. Assumed care of pt at this time. Assessment complete. Pt alert and oriented x 4. Shows no sign of distress. Fall risk arm band in place. Denies SOB and chest pain. Pt lungs clear bilaterally. Cap refill  less than 3 seconds. Pt denies numbness and tingling to all extremities. Stated pain 4/10. Pt has 18g  IV to left hand. Patient oriented to room to include use of call bell, meal ordering, and use of incentive spirometer, patient able to get IS to 3000. Patient instructed to order lunch and given explanation of home for dinner plan. Phone and call bell left within reach. Educated on pain medication availability and possible side effects, as well as need to call for assistance before getting out of bed. Patient verbalized understanding.      Symptoms present on arrival:  [] Pain 4/10   [] Medicated  [] Nausea   [] Medicated   [] Hypotension/Hypertension   [] Provider notified

## 2021-10-28 NOTE — OP NOTES
[address] [facility]  Total Hip Arthroplasty      Patient: Leah Nichols MRN: [id number]  SSN: [id number]    YOB: 1960  Age: 59 y.o. Sex: male      Date of Surgery: 10/28/2021   Preoperative Diagnosis: RIGHT HIP OSTEOARTHRITIS   Postoperative Diagnosis: RIGHT HIP OSTEOARTHRITIS   Location: [facility]  Surgeon: Mannie Evans MD  Assistant: Samantha Benito PA-C    Anesthesia: general    Procedure: Total Right Hip Arthroplasty    Findings: Degenerative joint disease of the right hip. Estimated Blood Loss: 300ml    Specimens: None    Complications: none    Implants:   Implant Name Type Inv. Item Serial No.  Lot No. LRB No. Used Action   LINER ACET 52-54 MM NEUT XL 36 MM HIP UHMWPE GOEVANNY - [id number]  LINER ACET 52-54 MM NEUT XL 36 MM HIP UHMWPE GEOVANNY  [facility] [employer] [id number] Right 1 Implanted   SHELL ACET 3 HOLE 54 MM HIP LOGICAL G-SERIES - [id number]  SHELL ACET 3 HOLE 54 MM HIP LOGICAL G-SERIES  [facility] [employer] [id number] Right 1 Implanted   HEAD FEM OFFSET 4MM 36 MM HIP CERM - [id number]  HEAD FEM OFFSET 4MM 36 MM HIP CERM  [employer] [id number] Right 1 Implanted   SPARTAN HIP STEM    [facility] [employer] [id number] Right 1 Implanted       Procedure Detail:  After the patient was brought to the operating suite, He was effectively anesthetized using general anesthesia, then transferred to the [place] table and secured in a standard fashion. His right hip was then prepped and draped in a normal sterile orthopedic fashion. He was given appropriate intravenous antibiotics preoperatively. After a proper timeout was performed, a direct anterior approach to the hip was performed using a short Young-Castro interval. Anterior capsulotomy was performed. The degenerative changes of the hip were noted. Femoral neck osteotomy was then performed to the templated area. The head and neck were removed. The pulvinar and labrum were excised.  The acetabulum was then reamed up to 54 mm with good bleeding cancellous bone obtained. The cup was then irrigated with pulse lavage system. A 54 mm Signature Logical G cup was then impacted in place with excellent stable fixation obtained, placing the cup at about 45 degrees of abduction, 20 degrees of anteversion. The liner was then impacted in place. A screw was not placed. Attention was turned to the femur, which was delivered into the wound with a combination of extension, external rotation, and adduction, and using the hook on the Byron table to deliver the femur into the wound. The canal was broached up to a size 5 for the Spartan stem system with excellent stable fixation obtained. A trial reduction was then performed with the standard neck offset and 36 mm head balls with various neck lengths. With the +4, he appeared to have equalization of leg lengths and restoration of offset radiographically using the c-arm and joint point navigation system, and excellent functional stability was noted. The trial broach was removed. The canal was irrigated with the pulse lavage system. The final components were impacted in place with excellent stable fixation obtained once again. The final reduction was performed and once again leg lengths and offset were restored radiographically, using the C-arm radiographically intraoperatively, and excellent functional stability was noted. The wound was then irrigated one more time, and then closed in layers. The fascia of the tensor was closed with #1 Vicryl in a running type stitch. Subcutaneous tissue was closed with 2-0 Vicryl in a simple buried stitch, and the skin was closed with Prineo. Dry, sterile dressing was then applied. He tolerated this well, was transferred to the bed, and taken to recovery room, extubated, in stable condition. All sponge and needle counts were correct.     Signed By: Julianne White MD     October 28, 2021

## 2021-10-28 NOTE — INTERVAL H&P NOTE
Update History & Physical    The Patient's History and Physical of October 27, 2021 was reviewed with the patient and I examined the patient. There was no change. The surgical site was confirmed by the patient and me. Plan:  The risk, benefits, expected outcome, and alternative to the recommended procedure have been discussed with the patient. Patient understands and wants to proceed with the procedure.     Electronically signed by Doug Espitia MD on 10/28/2021 at 7:24 AM No

## 2021-10-28 NOTE — PROGRESS NOTES
Problem: Self Care Deficits Care Plan (Adult)  Goal: *Acute Goals and Plan of Care (Insert Text)  Description: Initial Occupational Therapy Goals (10/28/2021) Within 7 day(s):    1. Patient will perform grooming standing sinkside with supervision for increased independence with ADLs. 2. Patient will perform LB dressing with supervision & A/E PRN for increased independence with ADLs. 3. Patient will perform toilet transfer with supervision for increased independence with ADLs. 4. Patient will perform all aspects of toileting with supervision for increased independence with ADLs. 5. Patient will independently apply energy conservation techniques with 1 verbal cue(s)for increased independence with ADLs. 6. Patient will perform bathroom mobility with supervision for increased independence/safety with ADLs. Outcome: Progressing Towards Goal   OCCUPATIONAL THERAPY EVALUATION    Patient: Bernard Finley (89 y.o. male)  Date: 10/28/2021  Primary Diagnosis: Osteoarthritis of right hip [M16.11]  Procedure(s) (LRB):  RIGHT TOTAL HIP REPLACEMENT ANTERIOR APPROACH WITH C-ARM (Right) Day of Surgery   Precautions: Fall, WBAT  PLOF: pt independent for ADLs/functional mobility    ASSESSMENT AND RECOMMENDATIONS:  Based on the objective data described below, the patient presents with RLE decreased ROM and strength affecting LE ADLs. Pt found seated in recliner chair, vitals assessed and WNL, pt reporting pain 3/10, agreeable to therapy. Educated pt on proper body mechanics for ADLs s/p THR. Pt completed upper body dressing with supervision seated in chair. Patient able to utilize LLE ankle-over-knee technique and bending forward with RLE for sock donning. Pt able to thread B feet through pants without assist, and SBA when standing to pull up to waist. Pt SBA for STS/bathroom mobility with vc for safe use of RW. Pt ambulated back to recliner, ice applied to R hip. Spouse present during session for education on home safety. Provided opportunity for pt to voice questions on ADL performance when home, pt has no further concerns. Patient will benefit from skilled Occupational Therapy intervention to maximize safety/independence with ADLs at d/c.    Education: Reviewed home safety, body mechanics, importance of moving every hour to prevent joint stiffness, role of ice for edema/pain control, Rolling Walker management/safety, and adaptive dressing techniques with patient verbalizing  understanding at this time     Patient will benefit from skilled intervention to address the above impairments. Patient's rehabilitation potential is considered to be Good  Factors which may influence rehabilitation potential include:   [x]             None noted  []             Mental ability/status  []             Medical condition  []             Home/family situation and support systems  []             Safety awareness  []             Pain tolerance/management  []             Other:        PLAN :  Recommendations and Planned Interventions:   [x]               Self Care Training                  [x]      Therapeutic Activities  [x]               Functional Mobility Training   []      Cognitive Retraining  []               Therapeutic Exercises           []      Endurance Activities  []               Balance Training                    []      Neuromuscular Re-Education  []               Visual/Perceptual Training     [x]      Home Safety Training  [x]               Patient Education                   [x]      Family Training/Education  []               Other (comment):    Frequency/Duration: Patient will be followed by Occupational Therapy 1-2 times per day/4-7 days per week to address goals. Discharge Recommendations: Home health with adult supervision at least 24 hours after d/c  Further Equipment Recommendations for Discharge: N/A     SUBJECTIVE:   Patient stated Shirley Gotti much can I do? Lodeniselen Rides    OBJECTIVE DATA SUMMARY:     Past Medical History:   Diagnosis Date  Arthritis     Coagulation disorder ([facility] [address]     factor V defiency von Leiden    Osteoarthritis of right hip 10/27/2021     Past Surgical History:   Procedure Laterality Date    HX ORTHOPAEDIC Left 2010    hip replacement     Barriers to Learning/Limitations: yes;  physical and altered mental status (i.e.Sedation, Confusion)  Compensate with: visual, verbal, tactile, kinesthetic cues/model    Home Situation/Prior Level of Function:   Home Situation  Home Environment: Private residence  # Steps to Enter: 8  Rails to Enter: No  One/Two Story Residence: Two story  # of Interior Steps: 20  Interior Rails: Left  Lift Chair Available: No  Living Alone: No  Support Systems: Spouse/Significant Other  Patient Expects to be Discharged to[de-identified] [employer]  Current DME Used/Available at Home: Brace/Splint, Walker, rolling, Cane, straight, Crutches  Tub or Shower Type: Tub/Shower combination  []  Right hand dominant   []  Left hand dominant    Cognitive/Behavioral Status:  Neurologic State: Alert  Orientation Level: Oriented to person;Oriented to place;Oriented to situation  Cognition: Follows commands  Safety/Judgement: Awareness of environment    Skin: R hip incision w/ Mepilex   Edema: compression hose in place & applied ice     Coordination: BUE  Coordination: Within functional limits  Fine Motor Skills-Upper: Left Intact; Right Intact    Gross Motor Skills-Upper: Left Intact; Right Intact    Balance:  Sitting: Intact  Standing: Intact; With support    Strength: BUE  Strength: Generally decreased, functional     Tone & Sensation:BUE  Tone: Normal  Sensation: Impaired (R hip)    Range of Motion: BUE  AROM: Generally decreased, functional    Functional Mobility and Transfers for ADLs:  Bed Mobility:  Scooting: Stand-by assistance  Transfers:  Sit to Stand: Stand-by assistance (vc)    ADL Assessment:  Feeding: Independent  Oral Facial Hygiene/Grooming: Stand-by assistance  Bathing: Contact guard assistance  Upper Body Dressing: Supervision  Lower Body Dressing: Contact guard assistance  Toileting: Stand by assistance     ADL Intervention:  Upper Body Dressing Assistance  Dressing Assistance: Supervision  Pullover Shirt: Supervision    Lower Body Dressing Assistance  Dressing Assistance: Contact guard assistance  Pants With Elastic Waist: Contact guard assistance  Socks: Stand-by assistance  Slip on Shoes with Back: Contact guard assistance  Leg Crossed Method Used: No  Position Performed: Seated in chair  Cues: Verbal cues provided;Visual cues provided    Cognitive Retraining  Safety/Judgement: Awareness of environment    Pain:  Pain level pre-treatment: 3/10  Pain level post-treatment: 2/10  Pain Intervention(s): Medication administer by Nursing (see MAR); Rest, Ice, Repositioning   Response to intervention: Nurse notified, see doc flow     Activity Tolerance:   Fair. Patient able to stand ~5 minute(s). Patient able to complete ADLs with intermittent rest breaks. Patient limited by pain, strength, ROM. Patient unsteady. Please refer to the flowsheet for vital signs taken during this treatment. After treatment:   [x]  Patient left in no apparent distress sitting up in chair  []  Patient sitting on EOB  []  Patient left in no apparent distress in bed  [x]  Call bell left within reach  [x]  Nursing notified  []  Caregiver present  [x]  Ice applied  []  SCD's on while back in bed  [] Bed alarm activated    COMMUNICATION/EDUCATION:   Communication/Collaboration:  [x]       Role of Occupational Therapy in the acute care setting. [x]      Home safety education was provided and the patient/caregiver indicated understanding. [x]      Patient/family have participated as able in goal setting and plan of care. [x]      Patient/family agree to work toward stated goals and plan of care. []      Patient understands intent and goals of therapy, but is neutral about his/her participation.   []      Patient is unable to participate in plan of care at this time. Thank you for this referral.  Atif Bazan, OTR/L  Time Calculation: 32 mins    Eval Complexity: History: MEDIUM Complexity : Expanded review of history including physical, cognitive and psychosocial  history ; Examination: LOW Complexity : 1-3 performance deficits relating to physical, cognitive , or psychosocial skils that result in activity limitations and / or participation restrictions ;    Decision Making:LOW Complexity : No comorbidities that affect functional and no verbal or physical assistance needed to complete eval tasks

## 2021-10-28 NOTE — DISCHARGE SUMMARY
[address] [address]     DISCHARGE SUMMARY     PATIENT: Amy Tinsley     MRN: [id number]   ADMIT DATE: 10/28/2021   BILLIN   DISCHARGE DATE: 10/28/2021     ATTENDING: Ginger Palacios MD   DICTATING: PALOMO Nichole     ADMISSION DIAGNOSIS: Osteoarthritis of right hip [M16.11]    DISCHARGE DIAGNOSIS: Status post RIGHT TOTAL HIP ARTHROPLASTY    HISTORY OF PRESENT ILLNESS: The patient is a 59y.o. year-old male   with ongoing right hip pain secondary to osteoarthritis of right hip. The patient's pain has persisted and progressed despite conservative treatments and therapies. The patient has at this time opted for surgical intervention. PAST MEDICAL HISTORY:   Past Medical History:   Diagnosis Date    Arthritis     Coagulation disorder ([facility] [address]     factor V defiency von Leiden    Osteoarthritis of right hip 10/27/2021       PAST SURGICAL HISTORY:   Past Surgical History:   Procedure Laterality Date    HX ORTHOPAEDIC Left 2010    hip replacement       ALLERGIES: No Known Allergies     CURRENT MEDICATIONS:  A list of medications prior to the time of admission include:  Prior to Admission medications    Medication Sig Start Date End Date Taking? Authorizing Provider   atorvastatin (LIPITOR) 80 mg tablet Take 80 mg by mouth nightly. Yes Provider, Historical   diphenhydrAMINE (BenadryL) 25 mg capsule Take 25 mg by mouth nightly. Yes Provider, Historical   cyanocobalamin (Vitamin B-12) 1,000 mcg tablet Take 1,000 mcg by mouth daily. Yes Provider, Historical   cholecalciferol (Vitamin D3) 25 mcg (1,000 unit) cap Take 1,000 Units by mouth daily. Yes Provider, Historical   acetaminophen (Tylenol Extra Strength) 500 mg tablet Take 1,000 mg by mouth every eight (8) hours as needed for Fever or Pain. Provider, Historical       FAMILY HISTORY: History reviewed. No pertinent family history.     SOCIAL HISTORY:   Social History Socioeconomic History    Marital status:    Tobacco Use    Smoking status: Never Smoker    Smokeless tobacco: Never Used   Vaping Use    Vaping Use: Never used   Substance and Sexual Activity    Alcohol use: Yes     Alcohol/week: 15.0 standard drinks     Types: 15 Glasses of wine per week    Drug use: Never       REVIEW OF SYSTEMS: All review of systems are negative. PHYSICAL EXAMINATION: For a detailed physical exam, please refer to the patient's chart. HOSPITAL COURSE: The patient was taken to surgery the day of admission. he underwent right total hip replacement via the anterior approach. Operative course was benign. Estimated blood loss approximately 300 cc. The patient was taken to the PACU in stable condition and was later taken to the floor in stable condition. Post-op Day #0, patient has done very well.  he has had little to no pain. he had been cleared by physical therapy with stair training. he was placed on Eliquis for DVT prophylaxis. his vitals have remained stable. he has also remained hemodynamically stable. The patient has been recommended for discharge home. DISCHARGE INSTRUCTIONS: The patient is to be discharged home. Discharge Medication List as of 10/28/2021  2:25 PM        START taking these medications    Details   apixaban (Eliquis) 2.5 mg tablet Take 1 Tablet by mouth two (2) times a day for 14 days. , Normal, Disp-28 Tablet, R-0      oxyCODONE IR (ROXICODONE) 5 mg immediate release tablet Take 1 Tablet by mouth every four (4) hours as needed for Pain for up to 7 days. Max Daily Amount: 30 mg., Normal, Disp-42 Tablet, R-0      cefadroxil (DURICEF) 500 mg capsule Take 1 Capsule by mouth two (2) times a day for 5 days. , Normal, Disp-10 Capsule, R-0           CONTINUE these medications which have NOT CHANGED    Details   atorvastatin (LIPITOR) 80 mg tablet Take 80 mg by mouth nightly., Historical Med      diphenhydrAMINE (BenadryL) 25 mg capsule Take 25 mg by mouth nightly., Historical Med      cyanocobalamin (Vitamin B-12) 1,000 mcg tablet Take 1,000 mcg by mouth daily. , Historical Med      cholecalciferol (Vitamin D3) 25 mcg (1,000 unit) cap Take 1,000 Units by mouth daily. , Historical Med           STOP taking these medications       celecoxib (CeleBREX) 200 mg capsule Comments:   Reason for Stopping:                 The patient is to continue at home with home physical therapy 3 times a week to work on gait training, range of motion, strengthening, and weightbearing exercises as tolerated on his right lower extremity. The patient is to progress from a walker to a cane to complete total weightbearing as tolerable. The patient is to continue to keep his incision dry. The patient is to followup with Dr. Zulema Lucia, [facility], and/or [facility] in the office approximately 10-14 days status post for x-rays and further evaluation.       PALOMO Power  11/19/2021

## 2021-10-28 NOTE — ANESTHESIA PREPROCEDURE EVALUATION
Relevant Problems   No relevant active problems       Anesthetic History   No history of anesthetic complications            Review of Systems / Medical History  Patient summary reviewed, nursing notes reviewed and pertinent labs reviewed    Pulmonary  Within defined limits                 Neuro/Psych   Within defined limits           Cardiovascular  Within defined limits                Exercise tolerance: >4 METS     GI/Hepatic/Renal  Within defined limits              Endo/Other        Arthritis     Other Findings   Comments: Factor V deficiency         Physical Exam    Airway  Mallampati: III  TM Distance: 4 - 6 cm  Neck ROM: decreased range of motion   Mouth opening: Normal     Cardiovascular  Regular rate and rhythm,  S1 and S2 normal,  no murmur, click, rub, or gallop  Rhythm: regular  Rate: normal         Dental  No notable dental hx       Pulmonary  Breath sounds clear to auscultation               Abdominal  GI exam deferred       Other Findings            Anesthetic Plan    ASA: 2  Anesthesia type: general            Anesthetic plan and risks discussed with: Patient      GA vs SAB discussed.  Pt prefers GA

## 2021-10-28 NOTE — ANESTHESIA POSTPROCEDURE EVALUATION
Post-Anesthesia Evaluation and Assessment    Cardiovascular Function/Vital Signs  Visit Vitals  /63   Pulse 61   Temp 37.7 °C (99.8 °F)   Resp 14   Ht 5' 9\" (1.753 m)   Wt 80.2 kg (176 lb 11.2 oz)   SpO2 100%   BMI 26.09 kg/m²       Patient is status post Procedure(s):  RIGHT TOTAL HIP REPLACEMENT ANTERIOR APPROACH WITH C-ARM. Nausea/Vomiting: Controlled. Postoperative hydration reviewed and adequate. Pain:  Pain Scale 1: FLACC (10/28/21 1237)  Pain Intensity 1: 0 (10/28/21 1237)   Managed. Neurological Status:   Neuro (WDL): Within Defined Limits (10/28/21 1225)   At baseline. Mental Status and Level of Consciousness: Baseline and stable. Pulmonary Status:   O2 Device: Nasal cannula (10/28/21 1208)   Adequate oxygenation and airway patent. Complications related to anesthesia: None    Post-anesthesia assessment completed. No concerns. Patient has met all discharge requirements.     Signed By: Carla Grant MD

## 2021-10-28 NOTE — PERIOP NOTES
TRANSFER - OUT REPORT:    Verbal report given to Donna RN (name) on Ayaz Turk  being transferred to [facility](unit) for routine post - op. Report consisted of patients Situation, Background, Assessment and   Recommendations(SBAR). Information from the following report(s) SBAR, Kardex, OR Summary, Intake/Output, MAR and Cardiac Rhythm NSR was reviewed with the receiving nurse. Lines:   Peripheral IV 10/28/21 Left; Outer Hand (Active)   Site Assessment Clean, dry, & intact 10/28/21 1225   Phlebitis Assessment 0 10/28/21 1225   Infiltration Assessment 0 10/28/21 1225   Dressing Status Clean, dry, & intact 10/28/21 1225   Dressing Type Transparent;Tape 10/28/21 1225   Hub Color/Line Status Green;Patent; Infusing 10/28/21 1225   Alcohol Cap Used No 10/28/21 0802        Opportunity for questions and clarification was provided.       Patient transported with:   Registered Nurse

## 2021-10-29 ENCOUNTER — HOME CARE VISIT (OUTPATIENT)
Dept: SCHEDULING | Facility: HOME HEALTH | Age: 61
End: 2021-10-29
Payer: COMMERCIAL

## 2021-10-29 PROCEDURE — 400013 HH SOC

## 2021-10-29 PROCEDURE — G0151 HHCP-SERV OF PT,EA 15 MIN: HCPCS

## 2021-10-29 PROCEDURE — A6212 FOAM DRG <=16 SQ IN W/BORDER: HCPCS

## 2021-10-29 NOTE — HOME HEALTH
PMHx: H+P Diagnosis Date    Arthritis      Coagulation disorder ([facility] [address]        factor VII    Osteoarthritis of right hip     SUBJECTIVE: I am hurting a little bit more than yesterday   LIVING SITUATION: Pt lives with wife in a multi level home with 7 steps to enter with wide railings. Pt bedroom and primary bathroom are on the second floor with 1 HR   REQUIRES CAREGIVER ASSISTANCE FOR: transportation, medications, IADLS, ALDS   PLOF: Pt was I with amb without A DEV. Pt was I with dressing and bathing. Pt was driving   MEDICATIONS REVIEWED AND UPDATED: Medications reconciled and all medications are available in the home this visit. The following education was provided regarding medications, medication interactions, and look a like medications. Medications are effective at this time. no severe interactions noted. Educated pt to take oxycodoen as prescirbed and reviewed bleeding precuations with the lexis ASTUDILLO MD APPT: Dr Machelel Perez 11/12/21  ROM: B LE WFL   STRENGTH: R hip flexors -3/5 R quads and hamstrings 3/5 R DF/PF 5/5  L hip flexors, quads, hamstrings DF/PF 5/5  WOUNDS:R hip bandges dry and intact no drainage noted, no signs or symptoms of infection noted. Hu Manzanares Next dressing change R hip bandages is 11/4/2. Per MD orders Change Mepilex dressing on day that is written on dressing. Next dressing change should be within 3-5 days. However, if patient is making follow-up visit with Dr. Machelle Perez the day after the dressing is due, leave dressing on. If drainage noted, change PRN  BED MOBILITY:supine to sit and sit to supine with contra lateral leg assistance with SBA with MOD vc needed for technique and sequencing. Educated pt he could use a towel or belt to assist with R LE management   TRANSFERS:sit to stand from desk chair, bed x 3 and commode with RW with SBA with B UE support for push off.  Pt extends R LE fwd on all transfers   GAIT: Pt amb 30 ft x 2 with RW with step too antalgic gait pattern decreased B step length and decreased R HS at inital contact and decrased R knee flexion during swing phase of gait with SBA 1 vc needed to look ahead when amb   STAIRS:Pt went up a flight of steps with B UE support 1 HR wth step too gait pattern with SBA 1 vc needed for sequencing gait. Pt was dependent for walker management on steps  BALANCE: Pt scored 14/28 on Tinetti Balance Assessment placing pt at high  risk for falls. PATIENT EDUCATION PROVIDED THIS VISIT: safety, HEP, walking, deep breathing, Educated pt to use RW at all times when walking for safety. Edcated pt to change position every HR for pressure relief. Educated pt .elevate R LE throughout the day. Ice to R LE 20 min on 1 HR off throughout the day for pain relief and to contol swelling  HEP consisting of:  1. Walking every hour during the day with RW  2. supine therex R LE AP,QS,HS,SAQs, seated LAQS, hip flexors,pillow squeezes 3daily x 10  Written HEP issued, patient/caregiver verbalized understanding. CONTINUED NEED FOR THE FOLLOWING SKILLS: HH PT is medically necessary to  address pain,  decreased strength, increased swelling, impaired bed mobility, decreased independence with functional transfers, impaired gait, impaired stair negotiation, and impaired balance in order to improve functional independence, quality of life, return to PLOF, and reduce the risk for falls. ASSESSMENT: Pt presents with decreased strength R LE. Pt requires A with all bed mobility, transfers,and stairs. Pt is amb limited distances with RW with A. Pt presents with decreased dynamic standing balance   PLAN: Pt will be seen to establish and upgrade HEP. Gait training with  the least restrictive A DEV on flat and uneven surfaces. Bed mobility training, transfer training. Stair training. Dynamic standing balance re -education. Reinforece general safety precautions.   DISCHARGE PLANNING DISCUSSED: Discharge to self and family under MD supervision once all goals have been met or patient has reached max potential. Patient/caregiver verbalized understanding. Dr Katherin Mckoy office notifed that PT Admit completed 10/29/21. Medications reconciled and all medications are available in the home this visit. The following education was provided regarding medications, medication interactions, and look a like medications. Medications are effective at this time. no severe interactions noted. Educated pt to take oxycodoen as prescirbed and reviewed bleeding precuations with the elquis. Pt presents with decreased strength R LE. Pt requires A with all bed mobility, transfers,and stairs. Pt is amb limited distances with RW with A. Pt presents with decreased dynamic standing balance. Pt will be seen to establish and upgrade HEP. Gait training with  the least restrictive A DEV on flat and uneven surfaces. Bed mobility training, transfer training. Stair training. Dynamic standing balance re -education. Reinforece general safety precautions.

## 2021-10-29 NOTE — Clinical Note
Dr Lewis Sensing office notifed that PT Admit completed 10/29/21. Medications reconciled and all medications are available in the home this visit. The following education was provided regarding medications, medication interactions, and look a like medications. Medications are effective at this time. no severe interactions noted. Educated pt to take oxycodoen as prescirbed and reviewed bleeding precuations with the elquis. Pt presents with decreased strength R LE. Pt requires A with all bed mobility, transfers,and stairs. Pt is amb limited distances with RW with A. Pt presents with decreased dynamic standing balance. Pt will be seen to establish and upgrade HEP. Gait training with  the least restrictive A DEV on flat and uneven surfaces. Bed mobility training, transfer training. Stair training. Dynamic standing balance re -education. Reinforece general safety precautions.

## 2021-11-01 ENCOUNTER — HOME CARE VISIT (OUTPATIENT)
Dept: SCHEDULING | Facility: HOME HEALTH | Age: 61
End: 2021-11-01
Payer: COMMERCIAL

## 2021-11-01 ENCOUNTER — HOME CARE VISIT (OUTPATIENT)
Dept: HOME HEALTH SERVICES | Facility: HOME HEALTH | Age: 61
End: 2021-11-01
Payer: COMMERCIAL

## 2021-11-01 VITALS
OXYGEN SATURATION: 98 % | HEART RATE: 58 BPM | TEMPERATURE: 97.5 F | SYSTOLIC BLOOD PRESSURE: 118 MMHG | RESPIRATION RATE: 16 BRPM | DIASTOLIC BLOOD PRESSURE: 80 MMHG

## 2021-11-01 PROCEDURE — G0157 HHC PT ASSISTANT EA 15: HCPCS

## 2021-11-01 NOTE — HOME HEALTH
SUBJECTIVE: Patient reported feeling low pain from R hip #1/10 this afternoon. .  ASSESSMENT: Patient is progressing toward goals for gait and bed mobility. Patient continues to need SBA to ambulate safely using FWW but progressed to walking longer distance inside his home for gait training today. Pt ambulated with decreased olvin and needs to keep working on take longer steps, to increase R knee flexion, and to promote heel strike on initial contact. Pt did not report any increase in pain after walking. Pt also previously needed SBA to get in and out of bed and to scoot L < > R in bed but progressed to Supervision for bed mobility training today. Pt demonstrated good technique and ability to perform bed mobility with no assistance. He used his L LE to assist R LE get on and off the bed. In addition, patient demonstrated good knowledge and ability to perform supine therapeutic exercises using exercise sheet. Pt did not report any increase in pain after completing ther exs. Tyron Donald PLAN: Patient will be seen 2w1 3w1 to work on increased safety with gait, stairs negotiation, and improving transfer technique. Tyron Donald DISCUSSED DISCHARGE PLANNING: Discharge to self and family under MD supervision once all goals have been met or patient has reached max potential. Patient/caregiver verbalized understanding.

## 2021-11-02 VITALS
OXYGEN SATURATION: 98 % | DIASTOLIC BLOOD PRESSURE: 64 MMHG | HEART RATE: 54 BPM | TEMPERATURE: 98.4 F | RESPIRATION RATE: 14 BRPM | SYSTOLIC BLOOD PRESSURE: 124 MMHG

## 2021-11-04 ENCOUNTER — HOME CARE VISIT (OUTPATIENT)
Dept: SCHEDULING | Facility: HOME HEALTH | Age: 61
End: 2021-11-04
Payer: COMMERCIAL

## 2021-11-04 PROCEDURE — G0157 HHC PT ASSISTANT EA 15: HCPCS

## 2021-11-05 ENCOUNTER — HOME CARE VISIT (OUTPATIENT)
Dept: SCHEDULING | Facility: HOME HEALTH | Age: 61
End: 2021-11-05
Payer: COMMERCIAL

## 2021-11-05 VITALS
RESPIRATION RATE: 16 BRPM | HEART RATE: 66 BPM | SYSTOLIC BLOOD PRESSURE: 132 MMHG | DIASTOLIC BLOOD PRESSURE: 78 MMHG | TEMPERATURE: 97.4 F | OXYGEN SATURATION: 96 %

## 2021-11-05 VITALS
RESPIRATION RATE: 16 BRPM | DIASTOLIC BLOOD PRESSURE: 81 MMHG | TEMPERATURE: 98.5 F | SYSTOLIC BLOOD PRESSURE: 140 MMHG | HEART RATE: 60 BPM | OXYGEN SATURATION: 97 %

## 2021-11-05 PROCEDURE — G0157 HHC PT ASSISTANT EA 15: HCPCS

## 2021-11-05 NOTE — HOME HEALTH
SUBJECTIVE: Patient reported feeling decreased pain #0/10 from his R hip this visit which increased to about #1/10 after walking outside and negotiating stairs. .  ASSESSMENT: Patient is progressing toward goals for gait and stairs. Pt previously needed close S to ambulate safely but progressed to walking longer distance outside over uneven surfaces with Supervision using walking sticks x 2 for gait training today. Pt ambulated with improved step length and posture but needs to work on increasing foot clearance for safety. Pt also previously needed close S to go up and down stairs but progressed to Supervision for stairs training using SPC to promote safety when entering and exiting his home. In addition, pt demonstrated good technique and ability to peform car transfers Mod I and measured 86 degrees for R hip flexion in standing. Discussed with patient/CG plan to discharge pt from [facility] PT services next week. Pt/CG verbalized understanding and is in agreement with discharge plan. Sylvester Garnett PLAN: Patient will be seen 3w1 to increase safety with gait over uneven surfaces, to increase safety with stairs negotiation, and to increase strength of B LE. Sylvester Garnett DISCHARGE PLANNING DISCUSSED: Discharge patient to family with MD supervision when all goals are met. Advised pt/CG that patient will be discharged next week. Pt/Caregiver did verbalize understanding of discharge planning.   .  MD FOLLOW-UP APPOINTMENT: Friday, 11/12/21, AT 10 AM.

## 2021-11-09 ENCOUNTER — HOME CARE VISIT (OUTPATIENT)
Dept: SCHEDULING | Facility: HOME HEALTH | Age: 61
End: 2021-11-09
Payer: COMMERCIAL

## 2021-11-09 PROCEDURE — G0157 HHC PT ASSISTANT EA 15: HCPCS

## 2021-11-10 ENCOUNTER — HOME CARE VISIT (OUTPATIENT)
Dept: SCHEDULING | Facility: HOME HEALTH | Age: 61
End: 2021-11-10
Payer: COMMERCIAL

## 2021-11-10 VITALS
OXYGEN SATURATION: 98 % | RESPIRATION RATE: 16 BRPM | HEART RATE: 65 BPM | DIASTOLIC BLOOD PRESSURE: 89 MMHG | TEMPERATURE: 97.9 F | SYSTOLIC BLOOD PRESSURE: 142 MMHG

## 2021-11-10 VITALS
TEMPERATURE: 96.7 F | SYSTOLIC BLOOD PRESSURE: 118 MMHG | OXYGEN SATURATION: 98 % | RESPIRATION RATE: 16 BRPM | HEART RATE: 58 BPM | DIASTOLIC BLOOD PRESSURE: 80 MMHG

## 2021-11-10 PROCEDURE — G0157 HHC PT ASSISTANT EA 15: HCPCS

## 2021-11-10 NOTE — HOME HEALTH
SUBJECTIVE: Patient reported feeling pain #1-2/10 from his R hip this morning. Pt is compliant with walking and HEP. Roc Woods OBJECTIVE: See interventions. .  ASSESSMENT: Patient is progressing toward goals for gait, transfers, and balance. Pt previously continues to need S to ambulate safely but progressed to walking with and without using SPC inside his home for gait training today. Pt ambulated demonstrated improved gait mechanics after giving verbal cues to lift B foot higher to clear floor safely and to take longer steps. Pt also previously needed SBA to perform sit < > stand transfers but progressed to Mod I for transfers training from his bed and toilet. Pt demonstrated good technique and improved ability to perform transfers independently. In addition, pt demonstrated improved balance as evidence by improving his Tinetti balance assessment score from 14/28 from PT initial evaluation to 26/28 today. Moreover, pt measured 94 degrees for R hip flexion in standing and had no signs of infection from incision site when bandage was changed today. Discussed with patient/CG plan to discharge pt from [facility] PT services this week. Pt/CG verbalized understanding and is in agreement with discharge plan. Roc Woods PLAN: Patient will be seen 2w1 to increase safety with gait over uneven surfaces, to increase safety with stairs negotiation, and to improve ROM of R hip. Roc Woods DISCHARGE PLANNING DISCUSSED: Discharge patient to family with MD supervision when all goals are met. Advised pt/CG that patient will be discharged this week. Pt/Caregiver did verbalize understanding of discharge planning.   .  MD FOLLOW-UP APPOINTMENT: Friday, 11/12/21, AT 10 AM.

## 2021-11-11 ENCOUNTER — HOME CARE VISIT (OUTPATIENT)
Dept: SCHEDULING | Facility: HOME HEALTH | Age: 61
End: 2021-11-11
Payer: COMMERCIAL

## 2021-11-11 VITALS
RESPIRATION RATE: 12 BRPM | OXYGEN SATURATION: 98 % | HEART RATE: 58 BPM | TEMPERATURE: 97.5 F | SYSTOLIC BLOOD PRESSURE: 118 MMHG | DIASTOLIC BLOOD PRESSURE: 74 MMHG

## 2021-11-11 PROCEDURE — G0151 HHCP-SERV OF PT,EA 15 MIN: HCPCS

## 2021-11-11 NOTE — HOME HEALTH
SUBJECTIVE: Pt reports that he is having pain with WB through. in R LE. Pt reports that he just came back from a 15 min walk with his wife using is walking sticks. Discussed with pt that he might be overdoing it and that is why he is having increased pain   REQUIRES CAREGIVER ASSISTANCE FOR: transportation, IADLS   MEDICATIONS REVIEWED AND RECONCILED: no changes   NEXT MD APPT: Dr Brigitte Mota 11/12/21  ROM: B LE WFL  STRENGTH:  R hip flexors, quads and hamstrings 5/5   WOUNDS:R mepilex dressing peeled back incision dry and intact no drainage noted zip tie structure is intact incision is approximated   BED MOBILITY:supine to sit and sit to supine MOD I  TRANSFERS:sit to stand from car and from chair with and without B UE support for push off   GAIT: Pt amb household distances with reciprocal gait pattern MOD I with normal olvin and B step length no balance checks noted with amb . Per PTA visit 11/10/21 Patient progressed to ambulating 3 minutes using SPC Mod I to promote increased safety and improved stability using LRAD when ambulating. Patient ambulated with increased and slight R antalgic gait, but demonstrated improved gait mechanics. STAIRS: Pt came up a flight of steps with SPC with reciprocal gait pattern MOD I . Per PTA visit 11/10/21 Patient progressed to negotiating up and down 6 stairs in front of his home using SPC/handrail Mod I to promote safety when entering and exiting her home in preparation for MD appointments and community ambulation. Pt led with his L LE to go up stairs and led with R LE to go down stairs. Pt demonstrated good stability and safety awareness to negotiate stairs safely. BALANCE: Pt scored 25/28 on Tinetti Balance Assessment placing pt at  low risk for falls. PATIENT EDUCATION PROVIDED THIS VISIT: safety, HEP, walking, deep breathing, Cont to use SPC at all times when amb for safety     HEP consisting of: Cherl Spinner Walking every hour during the daytime for 3-5 minutes using LRAD.     2. Supine: APs, heel slides, quad sets, and SAQ x 10 reps each, 3x/day. 3. Sitting: APs, LAQ, hip flexion, and hip add x 10 reps each 3x/day. 4. Standing: R hip flexion x 10 reps each 3x/day. Written HEP issued, patient/caregiver verbalized understanding. Patient is s/p  R THR  and has been treated for, strengthening, gait training, stair training, HEP training, safety training, and balance training. On [facility] 10/29/21  strength  was R hip flexors -3/5 R quads and hamstrings 3/5 R DF/PF 5/5  L hip flexors, quads, hamstrings DF/PF 5/5. Today at [place] strength  is R hip flexors, quads and hamstrings 5/5 . On [facility] bed mobility was :supine to sit and sit to supine with contra lateral leg assistance with SBA with MOD vc needed for technique and sequencing. Educated pt he could use a towel or belt to assist with R LE management. Today at [place] bed mobility is supine to sit and sit to supine MOD I. On [facility] transfers were sit to stand from desk chair, bed x 3 and commode with RW with SBA with B UE support for push off. Pt extends R LE fwd on all transfers . Today at [place] transfers are sit to stand from car and from chair with and without B UE support for push off . On SOC gait was Pt amb 30 ft x 2 with RW with step too antalgic gait pattern decreased B step length and decreased R HS at inital contact and decrased R knee flexion during swing phase of gait with SBA 1 vc needed to look ahead when amb  Today at [place] gait is Pt amb household distances with reciprocal gait pattern MOD I with normal olvin and B step length no balance checks noted with amb . Per PTA visit 11/10/21 Patient progressed to ambulating 3 minutes using SPC Mod I to promote increased safety and improved stability using LRAD when ambulating. Patient ambulated with increased and slight R antalgic gait, but demonstrated improved gait mechanics. . On [facility] stairs were Pt went up a flight of steps with B UE support 1 HR wth step too gait pattern with SBA 1 vc needed for sequencing gait. Pt was dependent for walker management on steps. Today on [place] stairs are Pt came up a flight of steps with SPC with reciprocal gait pattern MOD I . Per PTA visit 11/10/21 Patient progressed to negotiating up and down 6 stairs in front of his home using SPC/handrail Mod I to promote safety when entering and exiting her home in preparation for MD appointments and community ambulation. Pt led with his L LE to go up stairs and led with R LE to go down stairs. Pt demonstrated good stability and safety awareness to negotiate stairs safely. On [facility] Pt scored 14/28 on Tinetti Balance Assessment placing pt at high  risk for falls. .  Today at [place] pt scored a 25/28 on Tinetti Balance Assessment placing pt as a  low  fall risk. Pt did have more than a 4 point statistical improvment Pt has made progress and has met all goals. Discharge plan: Discharge from [facility] services as all goals have been met.   Dr Cynthia Galvez office notifed of DC from [facility] AT [facility] with goals met

## 2021-11-11 NOTE — HOME HEALTH
SUBJECTIVE: Patient was sitting outside upon LPTA arrival and reported feeling low pain from R hip #1/10. .  OBJECTIVE: See interventions. .  ASSESSMENT: Patient is progressing toward goals for gait and stairs. Pt previously need Supervision to ambulate safely but progressed to Mod I for gait training outside today over uneven surfaces using SPC. Pt ambulated with increased olvin and slight R antalgic gait but demonstrated improved gait mechanics when ambulating. Pt reported slight increase in pain #2/10 from his R hip while he is walking but decreased to #1/10 once he sits down. Pt also previously needed Supervision to go up and down stairs but progressed to Mod I for stairs training for stairs in front of his home. Pt demonstrated good safety awareness using SPC to negotiate stairs independently. In addition, pt measured 0-90 degrees for R hip flexion in standing today. Discussed with patient/CG plan to discharge pt from [facility] PT services this week. Pt/CG verbalized understanding and is in agreement with discharge plan. Brynn Landeros PLAN: Patient will be seen 2w1 to increase safety with gait over uneven surfaces, to increase safety with stairs negotiation, and to improve ROM of R hip. Brynn Landeros DISCHARGE PLANNING DISCUSSED: Discharge patient to family with MD supervision when all goals are met. Advised pt/CG that patient will be discharged this week. Pt/Caregiver did verbalize understanding of discharge planning.   .  MD FOLLOW-UP APPOINTMENT: Friday, 11/12/21, AT 10 AM.

## 2021-11-11 NOTE — Clinical Note
Patient is s/p  R THR  and has been treated for, strengthening, gait training, stair training, HEP training, safety training, and balance training. On [facility] 10/29/21  strength  was R hip flexors -3/5 R quads and hamstrings 3/5 R DF/PF 5/5  L hip flexors, quads, hamstrings DF/PF 5/5. Today at [place] strength  is R hip flexors, quads and hamstrings 5/5 . On [facility] bed mobility was :supine to sit and sit to supine with contra lateral leg assistance with SBA with MOD vc needed for technique and sequencing. Educated pt he could use a towel or belt to assist with R LE management. Today at [place] bed mobility is supine to sit and sit to supine MOD I. On [facility] transfers were sit to stand from desk chair, bed x 3 and commode with RW with SBA with B UE support for push off. Pt extends R LE fwd on all transfers . Today at [place] transfers are sit to stand from car and from chair with and without B UE support for push off . On [facility] gait was Pt amb 30 ft x 2 with RW with step too antalgic gait pattern decreased B step length and decreased R HS at inital contact and decrased R knee flexion during swing phase of gait with SBA 1 vc needed to look ahead when amb  Today at [place] gait is Pt amb household distances with reciprocal gait pattern MOD I with normal olvin and B step length no balance checks noted with amb . Per PTA visit 11/10/21 Patient progressed to ambulating 3 minutes using SPC Mod I to promote increased safety and improved stability using LRAD when ambulating. Patient ambulated with increased and slight R antalgic gait, but demonstrated improved gait mechanics. . On [facility] stairs were Pt went up a flight of steps with B UE support 1 HR wth step too gait pattern with SBA 1 vc needed for sequencing gait. Pt was dependent for walker management on steps. Today on [place] stairs are Pt came up a flight of steps with SPC with reciprocal gait pattern MOD I .  Per PTA visit 11/10/21 Patient progressed to negotiating up and down 6 stairs in front of his home using SPC/handrail Mod I to promote safety when entering and exiting her home in preparation for MD appointments and community ambulation. Pt led with his L LE to go up stairs and led with R LE to go down stairs. Pt demonstrated good stability and safety awareness to negotiate stairs safely. On [facility] Pt scored 14/28 on Tinetti Balance Assessment placing pt at high  risk for falls. .  Today at MD pt scored a 25/28 on Tinetti Balance Assessment placing pt as a  low  fall risk. Pt did have more than a 4 point statistical improvment Pt has made progress and has met all goals. Discharge plan: Discharge from [address] [place] [address] as all goals have been met.   Dr Cynthia Galvez office notifed of DC from [place] [address] with goals met

## 2021-11-22 ENCOUNTER — HOSPITAL ENCOUNTER (OUTPATIENT)
Dept: PHYSICAL THERAPY | Age: 61
Discharge: HOME | End: 2021-11-22
Payer: COMMERCIAL

## 2021-11-22 PROCEDURE — 97161 PT EVAL LOW COMPLEX 20 MIN: CPT

## 2021-11-22 PROCEDURE — 97110 THERAPEUTIC EXERCISES: CPT

## 2021-11-22 NOTE — PROGRESS NOTES
PHYSICAL THERAPY - DAILY TREATMENT NOTE    Patient Name: Chris Crow        Date: 2021  : 1960   YES Patient  Verified  Visit #:     Insurance: Payor: Luis Daniel Schneider / Plan: Rocky Vargas / Product Type: BJ /      In time: 11:40 Out time: 12:25   Total Treatment Time: 45     Medicare Time Tracking (below)   Total Timed Codes (min):  na 1:1 Treatment Time:  na     TREATMENT AREA = Right hip pain [M25.551]     SUBJECTIVE     Pain Level (on 0 to 10 scale):  1  / 10   Medication Changes/New allergies or changes in medical history, any new surgeries or procedures? YES    If yes, update Summary List   Subjective Functional Status/Changes:  []  No changes reported     Pt is a 59year old male who presents to PT today with c/o R hip and knee pain s/p R FLACA. DOS: 10/28/2021. Aggravating factors include prolonged walking. He reports his hip pain has steadily diminishing each day but has noticed onset of R knee \"soreness\" after walking >1 mile. Alleviating factors include wearing 'copper fit' knee sleeve. Pt is highly active and engages in kayaking (works as a  >30 years), yoga, weight lifting, and cycling. When he walks, he utilizes bilateral walking sticks describes limping which he wants to correct. He reports not having any hip post op precautions. Symptoms  Pain   Today:1/10   Best:0/10   Worst:2/10        OBJECTIVE    Gait:Ambulating with SPC, antalgic gait pattern with decreased stance time on R. Increased toeing out on R. Mild increase in genu valgus on R at stance. + Trendelenburg on R  Single leg stance:deferred   30 second sit to stand test: 14 complete no hands, narrow stance. ROM / Strength   [] Unable to assess          Strength (1-5)    Left Right   Hip Flexion 4 4    Extension 4- 3+    Abduction 4- 4-    Int. rotation 4 4    Ext. rotation 4 4 p!    Knee Flexion 5 5    Extension 5 5   Ankle Plantarflexion 4+ 4+    Dorsiflexion 4+ 4+       Flexibility: [] Unable to assess at this time  Hamstrings:    (L) Tightness= [x] WNL   [] Min   [] Mod   [] Severe    (R) Tightness= [] WNL   [x] Min   [] Mod   [] Severe  Quadriceps:    (L) Tightness= [] WNL   [x] Min   [] Mod   [] Severe    (R) Tightness= [] WNL   [] Min   [x] Mod   [] Severe             Therapeutic Procedures:  Min Procedure Specifics + Rationale   n/a [x]  Patient Education (performed throughout session) [x] Review HEP    [] Progressed/Changed HEP based on:   [] proper performance and advancement of Therex/TA   [] reduction in pain level    [] increased functional capacity       [] change in directional preference   15 [x] Therapeutic Exercise   [x]  See Flowsheet   Rationale: increase ROM and increase strength to improve the patients ability to participate in ADL's        Post Treatment Pain Level (on 0 to 10) scale:   0  / 10     ASSESSMENT    Assessment/Changes in Function: Clinically, patient's signs and symptoms are consistent with R hip and knee pain s/p R FLACA. Skilled physical therapy is indicated to address noted deficits. Rehabilitation will address limitations noted in evaluation, follow MD orders and work toward improving RLE strength, stability, and mobility so that patient may return to desired activities safely and with less discomfort.      Justification for Eval Code Complexity: low  Patient History (low 0, mod 1-2, high 3-4): mod   Examination (low 1-2, mod 3+, high 4+): low   Clinical Presentation (low: stable/uncomplicated; mod: evolving; high: unstable/unpredictable): low  Clinical Decision Making (low , mod 26-74, high 1-25): mod    [x]  See Plan of Care  []  See Progress Note/ Recertification  []  See Discharge Summary        Patient will continue to benefit from skilled PT services to modify and progress therapeutic interventions, address functional mobility deficits, address ROM deficits, address strength deficits, analyze and address soft tissue restrictions, analyze and cue movement patterns, analyze and modify body mechanics/ergonomics, and assess and modify postural abnormalities  to attain remaining goals   Progress toward goals / Updated goals:    See POC     PLAN    [x]  Upgrade activities as tolerated  [x]  Update interventions per flow sheet YES Continue plan of care   []  Discharge due to :    []  Other:      Therapist: Claudio Francis PT, DPT    Date: 11/22/2021 Time: 11:09 AM     Future Appointments   Date Time Provider Hany Borrego   11/22/2021 11:45 AM [facility]

## 2021-11-22 NOTE — PROGRESS NOTES
[address] [facility] PHYSICAL THERAPY  [address] [place] [address] Aniyah Palmer, [address] [employer] - Phone: [phone]  Fax: [phone] / [address] [facility]  Patient Name: Amy Tinsley : 1960   Medical   Diagnosis: Right hip pain [M25.551] Treatment Diagnosis: R FLACA   Onset Date: 10/28/2021     Referral Source: Thomas Mcgovern MD [place] [facility]): 2021   Prior Hospitalization: See medical history Provider #: [id number]   Prior Level of Function: Independent    Comorbidities: none   Medications: Verified on Patient Summary List   The Plan of Care and following information is based on the information from the initial evaluation.   ==========================================================================================  Assessment / key information:  Pt is a 59year old male who presents to PT today with c/o R hip and knee pain s/p R FLACA. DOS: 10/28/2021. Aggravating factors include prolonged walking. He reports his hip pain has steadily diminishing each day but has noticed onset of R knee \"soreness\" after walking >1 mile. Alleviating factors include wearing 'copper fit' knee sleeve. Pt is highly active and engages in kayaking (works as a  >30 years), yoga, weight lifting, and cycling. When he walks, he utilizes bilateral walking sticks describes limping which he wants to correct.      He reports not having any hip post op precautions.       Symptoms  Pain              Today:1/10              Best:0/10              Worst:2/10       ROM / Strength              Strength (1-5)      Left Right   Hip Flexion 4 4     Extension 4- 3+     Abduction 4- 4-     Int. rotation 4 4     Ext. rotation 4 4 p! Knee Flexion 5 5     Extension 5 5   Ankle Plantarflexion 4+ 4+     Dorsiflexion 4+ 4+         Flexibility:   Hamstrings:               (L) Tightness= [x]? WNL   []? Min   []? Mod   []?  Severe                       (R) Tightness= []? WNL   [x]? Min   []? Mod   []? Severe  Quadriceps:               (L) Tightness= []? WNL   [x]? Min   []? Mod   []? Severe                       (R) Tightness= []? WNL   []? Min   [x]? Mod   []? Severe      Pt was provided a HEP today and educated regarding their diagnosis and prognosis. Clinically, patient's signs and symptoms are consistent with R hip and knee pain s/p R FLACA. Skilled physical therapy is indicated to address noted deficits. Rehabilitation will address limitations noted in evaluation, follow MD orders and work toward improving RLE strength, stability, and mobility so that patient may return to desired activities safely and with less discomfort.  Thank you for the opportunity to assist in this case.     ==========================================================================================  Eval Complexity: History: LOW Complexity : Zero comorbidities / personal factors that will impact the outcome / POCExam:LOW Complexity : 1-2 Standardized tests and measures addressing body structure, function, activity limitation and / or participation in recreation  Presentation: LOW Complexity : Stable, uncomplicated  Clinical Decision Making:MEDIUM Complexity : FOTO score of 26-74Overall Complexity:LOW     Problem List: pain affecting function, decrease ROM, decrease strength, edema affecting function, impaired gait/ balance, decrease ADL/ functional abilitiies, decrease activity tolerance, decrease flexibility/ joint mobility and decrease transfer abilities   Treatment Plan may include any combination of the following: Therapeutic exercise, Therapeutic activities, Neuromuscular re-education, Physical agent/modality, Gait/balance training, Manual therapy, Patient education, Self Care training, Functional mobility training, Home safety training and Stair training  Patient / Family readiness to learn indicated by: asking questions, trying to perform skills and interest  Persons(s) to be included in education: patient (P)  Barriers to Learning/Limitations: None  Patient Goal (s): To normalize gait pattern   Patient self reported health status: excellent  Rehabilitation Potential: excellent       Short Term Goals: To be accomplished in  3  weeks:  Goal/Measure of Progress Goal Met? 1. Pt will be compliant with HEP for symptom management at home. Status at last Eval: NA Current Status: Unable n/a   2. Patient will report improved walking tolerance to 2 miles without onset of knee pain symptoms. Status at last Eval: NA Current Status: 1 mile n/a      Long Term Goals: To be accomplished in  6  weeks:  Goal/Measure of Progress Goal Met? 1. Pt will be independent with HEP at D/C for self management. Status at last Eval: NA Current Status: Unable n/a   2. Patient will demonstrate improved hip abduction strength on R to 4+/5 to allow for normalized gait pattern. Status at last Eval: NA Current Status: 4-/5 n/a   3. Patient will demonstrate improved bilateral hip extension strength to 4+/5 to allow him to ambulate with normalized gait pattern. Status at last Eval: NA Current Status: 3+/5 bilat n/a   4. Patient will increase FOTO Functional Status score to NV to decrease functional limitations. OBTAIN NEXT VISIT   Status at last Eval: NA Current Status: NV n/a       Frequency / Duration:   Patient to be seen  2-3  times per week for 6  weeks:  Patient / Caregiver education and instruction: provided education regarding diagnosis and prognosis as well as details regarding treatment plain. self care, activity modification and exercises  Therapist Signature: Maurice Terrell PT, DPT Date: [id number]/7709   Certification Period: na Time: 11:12 AM   ===========================================================================================  I certify that the above Physical Therapy Services are being furnished while the patient is under my care.   I agree with the treatment plan and certify that this therapy is necessary. Physician Signature:        Date:       Time:     Please sign and return to [employer] or you may fax the signed copy to [phone]. Thank you.   Chandu Coy MD

## 2021-11-24 ENCOUNTER — HOSPITAL ENCOUNTER (OUTPATIENT)
Dept: PHYSICAL THERAPY | Age: 61
Discharge: HOME | End: 2021-11-24
Payer: COMMERCIAL

## 2021-11-24 PROCEDURE — 97110 THERAPEUTIC EXERCISES: CPT

## 2021-11-24 NOTE — PROGRESS NOTES
PHYSICAL THERAPY - DAILY TREATMENT NOTE    Patient Name: Gladys Beltran        Date: 2021  : 1960   YES Patient  Verified  Visit #:      of   12  Insurance: Payor: Theresa Lipoma / Plan: Melburn Cough / Product Type: BJ /      In time: 11:00 Out time: 6653   Total Treatment Time: 52     Medicare/BCBS [place] Time Tracking (below)   Total Timed Codes (min):  52 1:1 Treatment Time:  52     TREATMENT AREA =  Right hip pain [M25.551]    SUBJECTIVE    Pain Level (on 0 to 10 scale):  1  / 10   Medication Changes/New allergies or changes in medical history, any new surgeries or procedures? NO    If yes, update Summary List   Subjective Functional Status/Changes:  []  No changes reported     Patient reports walking 1 mile after IE, experience mild hip pain and ms soreness. Returned to the gym this week and begun light strength exercises with DOMS following. OBJECTIVE    52 min Therapeutic Exercise:  [x]  See flow sheet   Rationale:      increase ROM and increase strength to improve the patients ability to perform ADL's with greater ease. min Patient Education:  YES  Reviewed HEP   []  Progressed/Changed HEP based on:   Cont with HEP     Other Objective/Functional Measures:    Introduced new strengthening. Pt with poor ms control and excessive trunk movement with      Post Treatment Pain Level (on 0 to 10) scale:   0  / 10     ASSESSMENT    Assessment/Changes in Function:     Favorable response to static stretching, reduction of hip pain while walking. Cont progressing as symptoms allow. []  See Progress Note/Recertification   Patient will continue to benefit from skilled PT services to analyze, cue, progress, modify,, demonstrate, instruct, and address, movement patterns, therapeutic interventions, postural abnormalities, soft tissue restrictions, ROM, strength, functional mobility, body mechanics/ergonomics, and home and community integration, to attain remaining goals.    Progress toward goals / Updated goals: · Short Term Goals: To be accomplished in  3  weeks:          Goal/Measure of Progress Goal Met? 1. Pt will be compliant with HEP for symptom management at home. Status at last Eval: NA Current Status: Unable n/a   2. Patient will report improved walking tolerance to 2 miles without onset of knee pain symptoms.     Status at last Eval: NA Current Status: 1 mile n/a          PLAN    []  Upgrade activities as tolerated YES Continue plan of care   []  Discharge due to :    []  Other:      Therapist: Gudelia Presley, PT, DPT    Date: 11/24/2021 Time: 10:32 AM     Future Appointments   Date Time Provider Hany Borrego   11/24/2021 11:00 AM Miri Lindsey PT [facility]   11/29/2021  8:00 AM Osbaldo Cartagena PTA [facility]   12/2/2021 10:15 AM Miri Lindsey PT [facility]   12/6/2021 10:15 AM Osbaldo Cartagena PTA [facility]   12/9/2021 10:15 AM Miri Lindsey PT [facility]   12/13/2021 11:45 AM Alvin Carpio PTA [facility]   12/16/2021 10:15 AM Jane Cabrera PTA [facility]

## 2021-11-29 ENCOUNTER — HOSPITAL ENCOUNTER (OUTPATIENT)
Dept: PHYSICAL THERAPY | Age: 61
Discharge: HOME | End: 2021-11-29
Payer: COMMERCIAL

## 2021-11-29 PROCEDURE — 97110 THERAPEUTIC EXERCISES: CPT

## 2021-11-29 PROCEDURE — 97530 THERAPEUTIC ACTIVITIES: CPT

## 2021-11-29 NOTE — PROGRESS NOTES
PHYSICAL THERAPY - DAILY TREATMENT NOTE    Patient Name: Tobi Moreno        Date: 2021  : 1960   yes Patient  Verified  Visit #:   3   of   12  Insurance: Payor: Marbella Gutierrez / Plan: Alba Seller / Product Type: BJ /      In time: 800 Out time: 729   Total Treatment Time: 59     TREATMENT AREA =  Right hip pain [M25.551]    SUBJECTIVE  Pain Level (on 0 to 10 scale):  .5  / 10   Medication Changes/New allergies or changes in medical history, any new surgeries or procedures?    no  If yes, update Summary List   Subjective Functional Status/Changes:  []  No changes reported     \"I was able to do 1.3 miles with no problem. 1.5 miles is the most I have walked. I felt my knee the beginin I also went to the gym and did light lifting with the legs, it was good. Not extra p! and I felt the muscles the next day.  I feel more in my left knee usually but I actually felt it in my hip this weekend\"          OBJECTIVE    44 min Therapeutic Exercise:  [x]  See flow sheet   Rationale:      increase ROM, increase strength and improve coordination to improve the patients ability to safely perform ADLs, bending/stooping/ lifting; perform prolong sitting/standing/ambulation; and negotiate stairs with no pain or limitations        15 min Therapeutic Activity: [x]  See flow sheet   Rationale:    increase ROM, increase strength, improve coordination, improve balance and increase proprioception to improve the patients ability to safely perform ADLs, bending/stooping/ lifting; perform prolong sitting/standing/ambulation; and negotiate stairs with no pain or limitations     Billed With/As:   [x] TE   [x] TA   [] Neuro   [] Self Care Patient Education: [x] Review HEP    [] Progressed/Changed HEP based on:   [x] positioning   [x] body mechanics   [x] transfers   [] heat/ice application    [x] other: Pt ed on importance and benefits of compliance with HEP, core strength/stability and proper posture; pt verbalized understanding Other Objective/Functional Measures:    VCs + demo to perform proper technique for TE  Initiated TE per flowsheet without c/o p!   dynamic warm up: HK, butt kicks, heel/toe amb, dyn. HS, and Frankenstein  VCs to not squat down with dyn HS   SLS on AE x30'' no LOB, performed on DD x 30''      Post Treatment Pain Level (on 0 to 10) scale:   0  / 10     ASSESSMENT  Assessment/Changes in Function:   Progressed TE without c/o increase p!  demos proper squat form with no pain  no LOB with balance TE   []  See Progress Note/Recertification   Patient will continue to benefit from skilled PT services to modify and progress therapeutic interventions, address functional mobility deficits, address ROM deficits, address strength deficits, analyze and address soft tissue restrictions, analyze and cue movement patterns, analyze and modify body mechanics/ergonomics, assess and modify postural abnormalities and instruct in home and community integration to attain remaining goals. Progress toward goals / Updated goals: · Short Term Goals: To be accomplished in  3  weeks:          Goal/Measure of Progress Goal Met? 1. Pt will be compliant with HEP for symptom management at home. Status at last Eval: Unable Current Status: Established         HEP progressing   2. Patient will report improved walking tolerance to 2 miles without onset of knee pain symptoms.     Status at last Eval: NT Current Status: 1.5 mile progressing           PLAN  [x]  Upgrade activities as tolerated yes Continue plan of care   []  Discharge due to :    []  Other:      Therapist: Adan Gramajo PTA    Date: 11/29/2021 Time: 9:39 AM     Future Appointments   Date Time Provider Hany Borrego   12/2/2021 10:15 AM Wendy Reeder PT [facility]   12/6/2021 10:15 AM Andrew Alamo PTA [facility]   12/9/2021 10:15 AM Wendy Reeder PT [facility]   12/13/2021 11:45 AM JON Galvez [facility]   12/16/2021 10:15 AM JON Galvez [facility]

## 2021-12-01 NOTE — PROGRESS NOTES
PHYSICAL THERAPY - DAILY TREATMENT NOTE    Patient Name: María Agee        Date: 2021  : 1960   YES Patient  Verified  Visit #:     Insurance: Payor: Gayle Hester / Plan: Julio Perez / Product Type: BJ /      In time: 10:15 Out time: 11:08   Total Treatment Time: 53     Medicare/BCBS [place] Time Tracking (below)   Total Timed Codes (min):  53 1:1 Treatment Time:  53     TREATMENT AREA =  Right hip pain [M25.551]    SUBJECTIVE    Pain Level (on 0 to 10 scale):  0-1  / 10   Medication Changes/New allergies or changes in medical history, any new surgeries or procedures? NO    If yes, update Summary List   Subjective Functional Status/Changes:  []  No changes reported     Pt reports going to the gym and working legs 3 days in a row. Did the hip abduction machine and reported not going heavy, but did 75% of what he would typically do and experienced some pain afterwards near incision area. OBJECTIVE    53 min Therapeutic Exercise:  [x]  See flow sheet   Rationale:      increase ROM and increase strength to improve the patients ability to perform ADL's with greater ease. min Patient Education:  YES  Reviewed HEP   []  Progressed/Changed HEP based on:   Cont with HEP     Other Objective/Functional Measures:    Antalgic gait pattern remains, improved as session progressed. Pt with increased genu valgus during SL squat, slight improvement with cues. Post Treatment Pain Level (on 0 to 10) scale:   0  / 10     ASSESSMENT    Assessment/Changes in Function:     Patient may over do exercising at the gym based on description of what he's doing. Encouraged him to take recover days. Overall, patient is progressing well, emphasis needs to be placed on body mechanics and education.      Step ups remain difficult for patient but able to complete with minimal compensation      []  See Progress Note/Recertification   Patient will continue to benefit from skilled PT services to analyze, cue, progress, modify,, demonstrate, instruct, and address, movement patterns, therapeutic interventions, postural abnormalities, soft tissue restrictions, ROM, strength, functional mobility, body mechanics/ergonomics, and home and community integration, to attain remaining goals. Progress toward goals / Updated goals:    Goal/Measure of Progress Goal Met? 1.  Pt will be compliant with HEP for symptom management at home.    Status at last Eval: Unable Current Status: Established         HEP progressing   2.  Patient will report improved walking tolerance to 2 miles without onset of knee pain symptoms.    Status at last Eval: NT Current Status: 1.5 mile progressing          PLAN    []  Upgrade activities as tolerated YES Continue plan of care   []  Discharge due to :    []  Other:      Therapist: Mary Hernández PT, DPT    Date: 12/1/2021 Time: 3:19 PM     Future Appointments   Date Time Provider Hany Borrego   12/2/2021 10:15 AM Shakira Manriquez PT [facility]   12/6/2021 10:15 AM Wily Bledsoe PTA [facility]   12/9/2021 10:15 AM Shakira Manriquez PT [facility]   12/13/2021 11:45 AM JON Riggins [facility]   12/16/2021 10:15 AM Jane Cabrera PTA [facility]

## 2021-12-02 ENCOUNTER — APPOINTMENT (OUTPATIENT)
Dept: PHYSICAL THERAPY | Age: 61
End: 2021-12-02

## 2021-12-02 ENCOUNTER — HOSPITAL ENCOUNTER (OUTPATIENT)
Dept: PHYSICAL THERAPY | Age: 61
Discharge: HOME | End: 2021-12-02
Payer: COMMERCIAL

## 2021-12-02 PROCEDURE — 97110 THERAPEUTIC EXERCISES: CPT

## 2021-12-06 ENCOUNTER — HOSPITAL ENCOUNTER (OUTPATIENT)
Dept: PHYSICAL THERAPY | Age: 61
Discharge: HOME | End: 2021-12-06
Payer: COMMERCIAL

## 2021-12-06 PROCEDURE — 97530 THERAPEUTIC ACTIVITIES: CPT

## 2021-12-06 PROCEDURE — 97110 THERAPEUTIC EXERCISES: CPT

## 2021-12-06 NOTE — PROGRESS NOTES
PHYSICAL THERAPY - DAILY TREATMENT NOTE    Patient Name: Franco Patel        Date: 2021  : 1960   yes Patient  Verified  Visit #:     Insurance: Payor: Denita Urena / Plan: Nahum Ellington / Product Type: BJ /      In time: 7752 Out time:    Total Treatment Time: 55     TREATMENT AREA =  Right hip pain [M25.551]    SUBJECTIVE  Pain Level (on 0 to 10 scale):  .5  / 10   Medication Changes/New allergies or changes in medical history, any new surgeries or procedures?    no  If yes, update Summary List   Subjective Functional Status/Changes:  []  No changes reported     \"I took off from the gym until yesterday. The pain in the knee has subsided.  I do feel a little in the hip but I feel like that its still healing so its ok\"     \"I feel ready after next time to do this on my own at the gym\"     OBJECTIVE     40 min Therapeutic Exercise:  [x]  See flow sheet   Rationale:      increase ROM, increase strength and improve coordination to improve the patients ability to safely perform ADLs, bending/stooping/ lifting; perform prolong sitting/standing/ambulation; and negotiate stairs with no pain or limitations        15 min Therapeutic Activity: [x]  See flow sheet   Rationale:    increase ROM, increase strength, improve coordination, improve balance and increase proprioception to improve the patients ability to safely perform ADLs, bending/stooping/ lifting; perform prolong sitting/standing/ambulation; and negotiate stairs with no pain or limitations     Billed With/As:   [x] TE   [x] TA   [] Neuro   [] Self Care Patient Education: [x] Review HEP    [] Progressed/Changed HEP based on:   [x] positioning   [x] body mechanics   [x] transfers   [] heat/ice application    [x] other: Pt ed on importance and benefits of compliance with HEP, core strength/stability and proper posture; pt verbalized understanding       Other Objective/Functional Measures:    VCs + demo to perform proper technique for TE  CCs to increase GM with retro monsterwalk, pt with difficulty keeping proper mechanics and demos increased valgus as well     left UE assist with 8'' step ups  attempted SL squat to mat, pt with poor mechanics and increase valgus, performed with TRX to chair with 2 AEs for elevation, pt able to perform properly and pain free  pt requested d/c NV    Post Treatment Pain Level (on 0 to 10) scale:   0 / 10     ASSESSMENT  Assessment/Changes in Function:   Progressed TE without c/o increase p!  demos good quad control with step ups, requires 1 UE assist   []  See Progress Note/Recertification   Patient will continue to benefit from skilled PT services to modify and progress therapeutic interventions, address functional mobility deficits, address ROM deficits, address strength deficits, analyze and address soft tissue restrictions, analyze and cue movement patterns, analyze and modify body mechanics/ergonomics, assess and modify postural abnormalities and instruct in home and community integration to attain remaining goals. Progress toward goals / Updated goals: · Short Term Goals: To be accomplished in  3  weeks:          Goal/Measure of Progress Goal Met? 1. Pt will be compliant with HEP for symptom management at home. Status at last Eval: Unable Current Status: compliant with HEP yes   2. Patient will report improved walking tolerance to 2 miles without onset of knee pain symptoms.     Status at last Eval: NT Current Status: 1.5 mile progressing           PLAN  [x]  Upgrade activities as tolerated yes Continue plan of care   []  Discharge due to :    [x]  Other: d/c with HEP NV     Therapist: Sadi Hdz PTA    Date: 12/6/2021 Time: 11:16 AM     Future Appointments   Date Time Provider Hany Borrego   12/9/2021 10:15 AM Tra Logan, PT [facility]   12/13/2021 11:45 AM Ric Salazar PTA [facility]   12/16/2021 10:15 AM Red Cabrera, PTA [facility]

## 2021-12-09 ENCOUNTER — HOSPITAL ENCOUNTER (OUTPATIENT)
Dept: PHYSICAL THERAPY | Age: 61
Discharge: HOME | End: 2021-12-09
Payer: COMMERCIAL

## 2021-12-09 PROCEDURE — 97110 THERAPEUTIC EXERCISES: CPT

## 2021-12-09 PROCEDURE — 97530 THERAPEUTIC ACTIVITIES: CPT

## 2021-12-09 NOTE — PROGRESS NOTES
PHYSICAL THERAPY - DAILY TREATMENT NOTE    Patient Name: Bernard Finley        Date: 2021  : 1960   YES Patient  Verified  Visit #:     Insurance: Payor: Chantelle Barr / Plan: Angela Laurent / Product Type: BJ /      In time: 10:15 Out time: 10:50   Total Treatment Time: 35     Medicare/BCBS [place] Time Tracking (below)   Total Timed Codes (min):  na 1:1 Treatment Time:  na     TREATMENT AREA =  Right hip pain [M25.551]    SUBJECTIVE    Pain Level (on 0 to 10 scale):  <1  / 10   Medication Changes/New allergies or changes in medical history, any new surgeries or procedures? NO    If yes, update Summary List   Subjective Functional Status/Changes:  []  No changes reported     I think I can continue with this on my own at the gym. OBJECTIVE    20 min Therapeutic Exercise:  [x]  See flow sheet   Rationale:      increase ROM and increase strength to improve the patients ability to perform ADL's with greater ease. 15 min Therapeutic Activity: See flow sheet  Reassessment    Rationale:   increase mobility, endurance, and strength to improve patient's ability to complete functional tasks with greater ease. min Patient Education:  YES  Reviewed HEP   [x]  Progressed/Changed HEP based on:   Updated HEP     Other Objective/Functional Measures:    Refer to DC note      Post Treatment Pain Level (on 0 to 10) scale:   <1  / 10     ASSESSMENT    Assessment/Changes in Function:     Refer to DC note     []  See Progress Note/Recertification   Patient will continue to benefit from skilled PT services to analyze, cue, progress, modify,, demonstrate, instruct, and address, movement patterns, therapeutic interventions, postural abnormalities, soft tissue restrictions, ROM, strength, functional mobility, body mechanics/ergonomics, and home and community integration, to attain remaining goals.    Progress toward goals / Updated goals:    Refer to DC note      PLAN    []  Upgrade activities as tolerated NO Continue plan of care   []  Discharge due to :    []  Other:      Therapist: Bolivar Diaz PT, DPT    Date: 12/9/2021 Time: 8:02 AM     Future Appointments   Date Time Provider Hany Borrego   12/9/2021 10:15 AM Cayla Lott PT [facility]   12/13/2021 11:45 AM Simran Youssef PTA [facility]   12/16/2021 10:15 AM Lary Cabrera PTA [facility]

## 2021-12-09 NOTE — PROGRESS NOTES
[facility] PHYSICAL THERAPY  [address] [place] [address], [address] - Phone: [phone]  Fax: [phone]  DISCHARGE SUMMARY FOR PHYSICAL THERAPY          Patient Name: Jessica Fermin : 1960   Treatment/Medical Diagnosis: Right hip pain [M25.551]   Referral Source: Adelfo Melendez MD [facility]): 2021   Prior Hospitalization: See medical history Provider #: [id number]   Visits from [facility]: 6 Missed Visits: 0     STG  Goal/Measure of Progress Goal Met? 1. Pt will be compliant with HEP for symptom management at home. Status at last Eval: Unable Current Status: Able Yes   2. Patient will report improved walking tolerance to 2 miles without onset of knee pain symptoms. Status at last Eval: 1 mile Current Status: >1 mile In progress     LTG  Goal/Measure of Progress Goal Met? 1. Pt will be independent with HEP at D/C for self management. Status at last Eval: Unable Current Status: Able Yes   2. Patient will demonstrate improved hip abduction strength on R to 4+/5 to allow for normalized gait pattern. Status at last Eval: 4-/5 Current Status: 4/5 No    3. Patient will demonstrate improved bilateral hip extension strength to 4+/5 to allow him to ambulate with normalized gait pattern. Status at last Eval: NA Current Status: 4-/5 bilat No    4. Patient will increase FOTO Functional Status score to 78 to decrease functional limitations. Status at last Eval: 79 Current Status: 67 No         Key Functional Changes/Progress: Reassessment performed today. Pt reports feeling like he is able to self manage his symptoms independently. Pt reports compliance with HEP and has been going to the gym weekly. Had discussion with patient to avoid overdoing it at the gym and to focus on body mechanics and technique over load/reps. Patient demonstrates much eagerness to return to PLOF.  Pt admits to over doing it at times when his hip is feeling good resulting in onset of hip pain. Pt is able to demonstrate independence with learned exercises. Although pt has not met all of his short and long term goals at this time, he is being discharged today secondary to above factors. Pt was educated to continue with daily exercise program and to contact the clinic should symptoms return or worsen. Thank you for allowing me the opportunity to assist in this case. Assessments/Recommendations: Discontinue therapy. Progressing towards or have reached established goals. If you have any questions/comments please contact us directly at [phone]. Thank you for allowing us to assist in the care of your patient. Therapist Signature: Marek Foster PT, DPT Date: 12/9/2021   Reporting Period: na Time: 0:74 AM      Certification Period: na       NOTE TO PHYSICIAN:  PLEASE COMPLETE THE ORDERS BELOW AND FAX TO   [facility] Physical Therapy: [phone]. If you are unable to process this request in 24 hours please contact our office: [phone].    ___ I have read the above report and request that my patient be discharged from therapy.      Physician Signature:        Date:       Time:

## 2021-12-13 ENCOUNTER — APPOINTMENT (OUTPATIENT)
Dept: PHYSICAL THERAPY | Age: 61
End: 2021-12-13
Payer: COMMERCIAL

## 2021-12-16 ENCOUNTER — APPOINTMENT (OUTPATIENT)
Dept: PHYSICAL THERAPY | Age: 61
End: 2021-12-16
Payer: COMMERCIAL

## (undated) DEVICE — GARMENT,MEDLINE,DVT,INT,CALF,MED, GEN2: Brand: MEDLINE

## (undated) DEVICE — SUT VCRL + 2-0 36IN CT1 UD --

## (undated) DEVICE — ZIP 16 SURGICAL SKIN CLOSURE DEVICE: Brand: ZIP 16 SURGICAL SKIN CLOSURE DEVICE

## (undated) DEVICE — HANDPIECE SET WITH HIGH FLOW TIP AND SUCTION TUBE: Brand: INTERPULSE

## (undated) DEVICE — SOL INJ L R 1000ML BG --

## (undated) DEVICE — BLADE ELECTRODE: Brand: EDGE

## (undated) DEVICE — GRIPPER SURGICAL RETRACTOR DISP

## (undated) DEVICE — ROCKER SWITCH PENCIL HOLSTER: Brand: VALLEYLAB

## (undated) DEVICE — Z DISCONTINUED USE (MFG CAT 7984-37) SOLUTION IV SODIUM CHL 0.9% 100 ML INJ

## (undated) DEVICE — DRESSING ALG W4XL8IN AG FOAM SUPERABSORBENT SIL ANTIMIC

## (undated) DEVICE — BLADE SAW 1.27X13X90 MM FOR LG BNE

## (undated) DEVICE — SUT VCRL + 1 36IN CT1 VIO --

## (undated) DEVICE — PACK PROCEDURE SURG TOT HIP ANTR CARTER CUST

## (undated) DEVICE — SOL IRRIGATION INJ NACL 0.9% 500ML BTL

## (undated) DEVICE — NEEDLE SPNL 20GA L3.5IN YEL HUB S STL REG WALL FIT STYL W/